# Patient Record
Sex: FEMALE | Race: WHITE | NOT HISPANIC OR LATINO | Employment: UNEMPLOYED | URBAN - METROPOLITAN AREA
[De-identification: names, ages, dates, MRNs, and addresses within clinical notes are randomized per-mention and may not be internally consistent; named-entity substitution may affect disease eponyms.]

---

## 2020-01-24 ENCOUNTER — OUTSIDE PLACE OF SERVICE (OUTPATIENT)
Dept: CARDIOLOGY | Facility: CLINIC | Age: 80
End: 2020-01-24
Payer: MEDICARE

## 2020-01-24 ENCOUNTER — CLINICAL SUPPORT (OUTPATIENT)
Dept: CARDIOLOGY | Facility: CLINIC | Age: 80
End: 2020-01-24
Attending: INTERNAL MEDICINE
Payer: MEDICARE

## 2020-01-24 ENCOUNTER — HOSPITAL ENCOUNTER (INPATIENT)
Facility: HOSPITAL | Age: 80
LOS: 10 days | Discharge: NURSING, REHAB OR LONG-TERM CARE | DRG: 315 | End: 2020-02-03
Attending: INTERNAL MEDICINE | Admitting: INTERNAL MEDICINE
Payer: MEDICARE

## 2020-01-24 DIAGNOSIS — I31.39 PERICARDIAL EFFUSION: ICD-10-CM

## 2020-01-24 DIAGNOSIS — Z95.0 PACEMAKER: ICD-10-CM

## 2020-01-24 DIAGNOSIS — I48.20 CHRONIC ATRIAL FIBRILLATION: ICD-10-CM

## 2020-01-24 DIAGNOSIS — B95.8 STAPH INFECTION: ICD-10-CM

## 2020-01-24 LAB
AORTIC VALVE CUSP SEPERATION: 2 CM
ASCENDING AORTA: 3.3 CM
AV INDEX (PROSTH): 0.5
AV MEAN GRADIENT: 10 MMHG
AV PEAK GRADIENT: 19 MMHG
AV VALVE AREA: 1.42 CM2
AV VELOCITY RATIO: 0.5
CV ECHO LV RWT: 0.43 CM
DOP CALC AO PEAK VEL: 2.2 M/S
DOP CALC AO VTI: 39.6 CM
DOP CALC LVOT AREA: 2.8 CM2
DOP CALC LVOT DIAMETER: 1.9 CM
DOP CALC LVOT PEAK VEL: 1.1 M/S
DOP CALC LVOT STROKE VOLUME: 56.11 CM3
DOP CALCLVOT PEAK VEL VTI: 19.8 CM
E WAVE DECELERATION TIME: 290 MSEC
E/A RATIO: 0.67
E/E' RATIO: 20 M/S
ECHO LV POSTERIOR WALL: 1 CM (ref 0.6–1.1)
ESTIMATED AVG GLUCOSE: 120 MG/DL (ref 68–131)
FRACTIONAL SHORTENING: 28 % (ref 28–44)
HBA1C MFR BLD HPLC: 5.8 % (ref 4–5.6)
INTERVENTRICULAR SEPTUM: 1.8 CM (ref 0.6–1.1)
IVC PROX: 2.4 CM
IVRT: 138 MSEC
LA MAJOR: 3.8 CM
LA MINOR: 3.3 CM
LA WIDTH: 3.7 CM
LEFT ATRIUM SIZE: 3 CM
LEFT ATRIUM VOLUME: 33.33 CM3
LEFT INTERNAL DIMENSION IN SYSTOLE: 3.4 CM (ref 2.1–4)
LEFT VENTRICULAR INTERNAL DIMENSION IN DIASTOLE: 4.7 CM (ref 3.5–6)
LEFT VENTRICULAR MASS: 265.22 G
LV LATERAL E/E' RATIO: 17.5 M/S
LV SEPTAL E/E' RATIO: 23.33 M/S
MV A" WAVE DURATION": 161 MSEC
MV PEAK A VEL: 1.05 M/S
MV PEAK E VEL: 0.7 M/S
MV STENOSIS PRESSURE HALF TIME: 87 MS
MV VALVE AREA P 1/2 METHOD: 2.53 CM2
PISA TR MAX VEL: 2.92 M/S
POCT GLUCOSE: 136 MG/DL (ref 70–110)
PV PEAK VELOCITY: 1.04 CM/S
RA MAJOR: 3.8 CM
RA PRESSURE: 15 MMHG
RA WIDTH: 3.5 CM
RIGHT VENTRICULAR END-DIASTOLIC DIMENSION: 2.4 CM
SINUS: 2.9 CM
STJ: 2.6 CM
TDI LATERAL: 0.04 M/S
TDI SEPTAL: 0.03 M/S
TDI: 0.04 M/S
TR MAX PG: 34 MMHG
TRICUSPID ANNULAR PLANE SYSTOLIC EXCURSION: 1.8 CM
TV REST PULMONARY ARTERY PRESSURE: 49 MMHG

## 2020-01-24 PROCEDURE — 11000001 HC ACUTE MED/SURG PRIVATE ROOM

## 2020-01-24 PROCEDURE — 93010 ELECTROCARDIOGRAM REPORT: CPT | Mod: S$GLB,,, | Performed by: STUDENT IN AN ORGANIZED HEALTH CARE EDUCATION/TRAINING PROGRAM

## 2020-01-24 PROCEDURE — 93010 EKG 12-LEAD: ICD-10-PCS | Mod: ,,, | Performed by: INTERNAL MEDICINE

## 2020-01-24 PROCEDURE — 93306 TTE W/DOPPLER COMPLETE: CPT | Mod: 26,,, | Performed by: STUDENT IN AN ORGANIZED HEALTH CARE EDUCATION/TRAINING PROGRAM

## 2020-01-24 PROCEDURE — 93005 ELECTROCARDIOGRAM TRACING: CPT

## 2020-01-24 PROCEDURE — 93306 ECHO (CUPID ONLY): ICD-10-PCS | Mod: 26,,, | Performed by: STUDENT IN AN ORGANIZED HEALTH CARE EDUCATION/TRAINING PROGRAM

## 2020-01-24 PROCEDURE — 25000003 PHARM REV CODE 250: Performed by: NURSE PRACTITIONER

## 2020-01-24 PROCEDURE — 99222 PR INITIAL HOSPITAL CARE,LEVL II: ICD-10-PCS | Mod: ,,, | Performed by: STUDENT IN AN ORGANIZED HEALTH CARE EDUCATION/TRAINING PROGRAM

## 2020-01-24 PROCEDURE — 99222 1ST HOSP IP/OBS MODERATE 55: CPT | Mod: ,,, | Performed by: STUDENT IN AN ORGANIZED HEALTH CARE EDUCATION/TRAINING PROGRAM

## 2020-01-24 PROCEDURE — 93010 ELECTROCARDIOGRAM REPORT: CPT | Mod: ,,, | Performed by: INTERNAL MEDICINE

## 2020-01-24 PROCEDURE — 94761 N-INVAS EAR/PLS OXIMETRY MLT: CPT

## 2020-01-24 PROCEDURE — 36415 COLL VENOUS BLD VENIPUNCTURE: CPT

## 2020-01-24 PROCEDURE — 63600175 PHARM REV CODE 636 W HCPCS: Performed by: NURSE PRACTITIONER

## 2020-01-24 PROCEDURE — 83036 HEMOGLOBIN GLYCOSYLATED A1C: CPT

## 2020-01-24 PROCEDURE — 93010 PR ELECTROCARDIOGRAM REPORT: ICD-10-PCS | Mod: S$GLB,,, | Performed by: STUDENT IN AN ORGANIZED HEALTH CARE EDUCATION/TRAINING PROGRAM

## 2020-01-24 RX ORDER — INSULIN ASPART 100 [IU]/ML
0-5 INJECTION, SOLUTION INTRAVENOUS; SUBCUTANEOUS
Status: DISCONTINUED | OUTPATIENT
Start: 2020-01-24 | End: 2020-02-03 | Stop reason: HOSPADM

## 2020-01-24 RX ORDER — CARVEDILOL 3.12 MG/1
3.12 TABLET ORAL 2 TIMES DAILY
Status: DISCONTINUED | OUTPATIENT
Start: 2020-01-24 | End: 2020-01-25

## 2020-01-24 RX ORDER — ACETAMINOPHEN 325 MG/1
650 TABLET ORAL EVERY 4 HOURS PRN
Status: DISCONTINUED | OUTPATIENT
Start: 2020-01-24 | End: 2020-01-27 | Stop reason: SDUPTHER

## 2020-01-24 RX ORDER — HYDROCODONE BITARTRATE AND ACETAMINOPHEN 10; 325 MG/1; MG/1
1 TABLET ORAL EVERY 6 HOURS PRN
Status: DISCONTINUED | OUTPATIENT
Start: 2020-01-24 | End: 2020-02-03 | Stop reason: HOSPADM

## 2020-01-24 RX ORDER — SODIUM CHLORIDE 0.9 % (FLUSH) 0.9 %
10 SYRINGE (ML) INJECTION
Status: DISCONTINUED | OUTPATIENT
Start: 2020-01-24 | End: 2020-02-03 | Stop reason: HOSPADM

## 2020-01-24 RX ORDER — ONDANSETRON 8 MG/1
8 TABLET, ORALLY DISINTEGRATING ORAL EVERY 8 HOURS PRN
Status: DISCONTINUED | OUTPATIENT
Start: 2020-01-24 | End: 2020-02-03 | Stop reason: HOSPADM

## 2020-01-24 RX ORDER — POLYETHYLENE GLYCOL 3350 17 G/17G
17 POWDER, FOR SOLUTION ORAL 2 TIMES DAILY PRN
Status: DISCONTINUED | OUTPATIENT
Start: 2020-01-24 | End: 2020-02-03 | Stop reason: HOSPADM

## 2020-01-24 RX ORDER — MAG HYDROX/ALUMINUM HYD/SIMETH 200-200-20
30 SUSPENSION, ORAL (FINAL DOSE FORM) ORAL EVERY 6 HOURS PRN
Status: DISCONTINUED | OUTPATIENT
Start: 2020-01-24 | End: 2020-02-03 | Stop reason: HOSPADM

## 2020-01-24 RX ORDER — IBUPROFEN 200 MG
16 TABLET ORAL
Status: DISCONTINUED | OUTPATIENT
Start: 2020-01-24 | End: 2020-02-03 | Stop reason: HOSPADM

## 2020-01-24 RX ORDER — TALC
6 POWDER (GRAM) TOPICAL NIGHTLY PRN
Status: DISCONTINUED | OUTPATIENT
Start: 2020-01-24 | End: 2020-02-03 | Stop reason: HOSPADM

## 2020-01-24 RX ORDER — HEPARIN SODIUM 5000 [USP'U]/ML
5000 INJECTION, SOLUTION INTRAVENOUS; SUBCUTANEOUS EVERY 8 HOURS
Status: DISCONTINUED | OUTPATIENT
Start: 2020-01-24 | End: 2020-01-25

## 2020-01-24 RX ORDER — CLOPIDOGREL BISULFATE 75 MG/1
75 TABLET ORAL DAILY
Status: DISCONTINUED | OUTPATIENT
Start: 2020-01-25 | End: 2020-02-03 | Stop reason: HOSPADM

## 2020-01-24 RX ORDER — GLUCAGON 1 MG
1 KIT INJECTION
Status: DISCONTINUED | OUTPATIENT
Start: 2020-01-24 | End: 2020-02-03 | Stop reason: HOSPADM

## 2020-01-24 RX ORDER — GABAPENTIN 300 MG/1
300 CAPSULE ORAL 3 TIMES DAILY
Status: DISCONTINUED | OUTPATIENT
Start: 2020-01-24 | End: 2020-02-03 | Stop reason: HOSPADM

## 2020-01-24 RX ORDER — DULOXETIN HYDROCHLORIDE 30 MG/1
30 CAPSULE, DELAYED RELEASE ORAL DAILY
Status: DISCONTINUED | OUTPATIENT
Start: 2020-01-25 | End: 2020-02-03 | Stop reason: HOSPADM

## 2020-01-24 RX ORDER — SIMETHICONE 125 MG
125 TABLET,CHEWABLE ORAL EVERY 6 HOURS PRN
Status: DISCONTINUED | OUTPATIENT
Start: 2020-01-24 | End: 2020-02-03 | Stop reason: HOSPADM

## 2020-01-24 RX ORDER — IBUPROFEN 200 MG
24 TABLET ORAL
Status: DISCONTINUED | OUTPATIENT
Start: 2020-01-24 | End: 2020-02-03 | Stop reason: HOSPADM

## 2020-01-24 RX ORDER — PANTOPRAZOLE SODIUM 40 MG/1
40 TABLET, DELAYED RELEASE ORAL DAILY
Status: DISCONTINUED | OUTPATIENT
Start: 2020-01-25 | End: 2020-02-03 | Stop reason: HOSPADM

## 2020-01-24 RX ADMIN — GABAPENTIN 300 MG: 300 CAPSULE ORAL at 09:01

## 2020-01-24 RX ADMIN — HEPARIN SODIUM 5000 UNITS: 5000 INJECTION, SOLUTION INTRAVENOUS; SUBCUTANEOUS at 09:01

## 2020-01-24 RX ADMIN — CARVEDILOL 3.12 MG: 3.12 TABLET, FILM COATED ORAL at 09:01

## 2020-01-24 NOTE — Clinical Note
Dilator removed and inserted 8F dilator from xiphoid process, in and out  Straight pericardiocentesis catheter 8.3F inserted, fluid sample obtained

## 2020-01-24 NOTE — Clinical Note
The site was marked. Prepped: subxiphoid process. Prepped with: ChloraPrep. The site was clipped. The patient was draped.

## 2020-01-24 NOTE — Clinical Note
Pericardiocentesis catheter inserted and pericardial tap performed under US guidance in the, removed fluid from the and sewed in with ERWIN drain in the pericardial space. Total fluid removed = 920 mL. Removed fluid appeared sanguineous.

## 2020-01-25 PROBLEM — I48.0 PAROXYSMAL ATRIAL FIBRILLATION: Status: ACTIVE | Noted: 2020-01-25

## 2020-01-25 PROBLEM — Z95.0 POSTSURGICAL CARDIAC PACEMAKER IN SITU: Status: ACTIVE | Noted: 2020-01-25

## 2020-01-25 LAB
ALBUMIN SERPL BCP-MCNC: 2.4 G/DL (ref 3.5–5.2)
ALP SERPL-CCNC: 79 U/L (ref 55–135)
ALT SERPL W/O P-5'-P-CCNC: 25 U/L (ref 10–44)
ANION GAP SERPL CALC-SCNC: 6 MMOL/L (ref 8–16)
ANION GAP SERPL CALC-SCNC: 7 MMOL/L (ref 8–16)
AST SERPL-CCNC: 17 U/L (ref 10–40)
BASOPHILS # BLD AUTO: 0.01 K/UL (ref 0–0.2)
BASOPHILS NFR BLD: 0.2 % (ref 0–1.9)
BILIRUB SERPL-MCNC: 1 MG/DL (ref 0.1–1)
BUN SERPL-MCNC: 11 MG/DL (ref 8–23)
BUN SERPL-MCNC: 12 MG/DL (ref 8–23)
CALCIUM SERPL-MCNC: 8.2 MG/DL (ref 8.7–10.5)
CALCIUM SERPL-MCNC: 8.3 MG/DL (ref 8.7–10.5)
CHLORIDE SERPL-SCNC: 103 MMOL/L (ref 95–110)
CHLORIDE SERPL-SCNC: 105 MMOL/L (ref 95–110)
CO2 SERPL-SCNC: 28 MMOL/L (ref 23–29)
CO2 SERPL-SCNC: 29 MMOL/L (ref 23–29)
CREAT SERPL-MCNC: 0.7 MG/DL (ref 0.5–1.4)
CREAT SERPL-MCNC: 0.7 MG/DL (ref 0.5–1.4)
DIFFERENTIAL METHOD: ABNORMAL
EOSINOPHIL # BLD AUTO: 0.1 K/UL (ref 0–0.5)
EOSINOPHIL NFR BLD: 1.2 % (ref 0–8)
ERYTHROCYTE [DISTWIDTH] IN BLOOD BY AUTOMATED COUNT: 14.8 % (ref 11.5–14.5)
EST. GFR  (AFRICAN AMERICAN): >60 ML/MIN/1.73 M^2
EST. GFR  (AFRICAN AMERICAN): >60 ML/MIN/1.73 M^2
EST. GFR  (NON AFRICAN AMERICAN): >60 ML/MIN/1.73 M^2
EST. GFR  (NON AFRICAN AMERICAN): >60 ML/MIN/1.73 M^2
GLUCOSE SERPL-MCNC: 123 MG/DL (ref 70–110)
GLUCOSE SERPL-MCNC: 210 MG/DL (ref 70–110)
HCT VFR BLD AUTO: 31.4 % (ref 37–48.5)
HGB BLD-MCNC: 9.6 G/DL (ref 12–16)
LYMPHOCYTES # BLD AUTO: 1.4 K/UL (ref 1–4.8)
LYMPHOCYTES NFR BLD: 23 % (ref 18–48)
MCH RBC QN AUTO: 28.4 PG (ref 27–31)
MCHC RBC AUTO-ENTMCNC: 30.6 G/DL (ref 32–36)
MCV RBC AUTO: 93 FL (ref 82–98)
MONOCYTES # BLD AUTO: 0.8 K/UL (ref 0.3–1)
MONOCYTES NFR BLD: 13 % (ref 4–15)
NEUTROPHILS # BLD AUTO: 3.8 K/UL (ref 1.8–7.7)
NEUTROPHILS NFR BLD: 62.6 % (ref 38–73)
PLATELET # BLD AUTO: 155 K/UL (ref 150–350)
PMV BLD AUTO: 12.5 FL (ref 9.2–12.9)
POCT GLUCOSE: 120 MG/DL (ref 70–110)
POCT GLUCOSE: 138 MG/DL (ref 70–110)
POCT GLUCOSE: 175 MG/DL (ref 70–110)
POCT GLUCOSE: 185 MG/DL (ref 70–110)
POTASSIUM SERPL-SCNC: 4.1 MMOL/L (ref 3.5–5.1)
POTASSIUM SERPL-SCNC: 4.3 MMOL/L (ref 3.5–5.1)
PROT SERPL-MCNC: 5.2 G/DL (ref 6–8.4)
RBC # BLD AUTO: 3.38 M/UL (ref 4–5.4)
SODIUM SERPL-SCNC: 138 MMOL/L (ref 136–145)
SODIUM SERPL-SCNC: 140 MMOL/L (ref 136–145)
T3FREE SERPL-MCNC: 1.3 PG/ML (ref 2.3–4.2)
T4 FREE SERPL-MCNC: 0.9 NG/DL (ref 0.71–1.51)
TSH SERPL DL<=0.005 MIU/L-ACNC: 0.18 UIU/ML (ref 0.4–4)
WBC # BLD AUTO: 6 K/UL (ref 3.9–12.7)

## 2020-01-25 PROCEDURE — 63600175 PHARM REV CODE 636 W HCPCS: Performed by: NURSE PRACTITIONER

## 2020-01-25 PROCEDURE — 36415 COLL VENOUS BLD VENIPUNCTURE: CPT

## 2020-01-25 PROCEDURE — 84481 FREE ASSAY (FT-3): CPT

## 2020-01-25 PROCEDURE — 94761 N-INVAS EAR/PLS OXIMETRY MLT: CPT

## 2020-01-25 PROCEDURE — 84443 ASSAY THYROID STIM HORMONE: CPT

## 2020-01-25 PROCEDURE — 99223 1ST HOSP IP/OBS HIGH 75: CPT | Mod: AI,,, | Performed by: INTERNAL MEDICINE

## 2020-01-25 PROCEDURE — 11000001 HC ACUTE MED/SURG PRIVATE ROOM

## 2020-01-25 PROCEDURE — 25000003 PHARM REV CODE 250: Performed by: INTERNAL MEDICINE

## 2020-01-25 PROCEDURE — 80048 BASIC METABOLIC PNL TOTAL CA: CPT

## 2020-01-25 PROCEDURE — 80053 COMPREHEN METABOLIC PANEL: CPT

## 2020-01-25 PROCEDURE — 84439 ASSAY OF FREE THYROXINE: CPT

## 2020-01-25 PROCEDURE — 85025 COMPLETE CBC W/AUTO DIFF WBC: CPT

## 2020-01-25 PROCEDURE — 25000003 PHARM REV CODE 250: Performed by: NURSE PRACTITIONER

## 2020-01-25 PROCEDURE — 99223 PR INITIAL HOSPITAL CARE,LEVL III: ICD-10-PCS | Mod: AI,,, | Performed by: INTERNAL MEDICINE

## 2020-01-25 RX ORDER — CARVEDILOL 6.25 MG/1
6.25 TABLET ORAL 2 TIMES DAILY
Status: DISCONTINUED | OUTPATIENT
Start: 2020-01-25 | End: 2020-02-03 | Stop reason: HOSPADM

## 2020-01-25 RX ORDER — LABETALOL HYDROCHLORIDE 5 MG/ML
10 INJECTION, SOLUTION INTRAVENOUS EVERY 6 HOURS PRN
Status: DISCONTINUED | OUTPATIENT
Start: 2020-01-25 | End: 2020-02-03 | Stop reason: HOSPADM

## 2020-01-25 RX ADMIN — GABAPENTIN 300 MG: 300 CAPSULE ORAL at 03:01

## 2020-01-25 RX ADMIN — LABETALOL HYDROCHLORIDE 10 MG: 5 INJECTION INTRAVENOUS at 06:01

## 2020-01-25 RX ADMIN — HEPARIN SODIUM 5000 UNITS: 5000 INJECTION, SOLUTION INTRAVENOUS; SUBCUTANEOUS at 05:01

## 2020-01-25 RX ADMIN — GABAPENTIN 300 MG: 300 CAPSULE ORAL at 09:01

## 2020-01-25 RX ADMIN — DULOXETINE 30 MG: 30 CAPSULE, DELAYED RELEASE ORAL at 09:01

## 2020-01-25 RX ADMIN — CARVEDILOL 3.12 MG: 3.12 TABLET, FILM COATED ORAL at 09:01

## 2020-01-25 RX ADMIN — CARVEDILOL 6.25 MG: 6.25 TABLET, FILM COATED ORAL at 09:01

## 2020-01-25 RX ADMIN — CLOPIDOGREL BISULFATE 75 MG: 75 TABLET, FILM COATED ORAL at 09:01

## 2020-01-25 RX ADMIN — PANTOPRAZOLE SODIUM 40 MG: 40 TABLET, DELAYED RELEASE ORAL at 09:01

## 2020-01-25 NOTE — NURSING
Patient arrived from [facility]. Placed in bed. Monitor placed and vitals charted per flowsheet. Notified virtual nurse of arrival and will give report to oncoming nurse.

## 2020-01-25 NOTE — PLAN OF CARE
Patient lying in bed. Respirations even and unlabored. BBS clear. Patient has elevated Blood pressure 175/74. Placed call with physician Awaiting further orders.

## 2020-01-25 NOTE — H&P
[facility]  Cardiology  History and Physical     Patient Name: Dori Whatley  MRN: [id number]  Admission Date: 1/24/2020  Code Status: Full Code   Attending Provider: Vadim Good MD   Primary Care Physician: Tru Sanchez MD  Principal Problem:<principal problem not specified>    Patient information was obtained from patient, spouse/SO, relative(s) and past medical records.     Subjective:     Chief Complaint:       HPI:  Hx obtained from the patient, her sister and   She is pleasant 79 year old female with advanced dementia  Patient was in her usual state of health till January 6 when she has pneumonia picture and was admitted to [facility]. She stayed for one week then was discharged to LTAC at [facility], where she had shortness of breath and was found to have Pleural effusion, she had thoracentesis  That was followed by CT chest which showed pericardial effusion and Echo was done that showed large pericardial effusion. Patient is hemodynamically stable BP toward the higher side. Transferred to [facility] for possible pericardiocentesis.     According to the family she was hospitalized at [place] and was completely confused for 3 days ? Viral illness. Also her blood count was low and hematology was consulted.     She has PPM that was placed by Dr. Garcia for CHB, hx of A.fib (Eliquis) from [place] [facility] about 3 months ago, she was on eliquis at home. No hx of PCI or CABG     Also she has hx of breast CA s/p surgery 30 years ago and she gets yearly Mammogram, according to them last one was last year and was ok.          Past Medical History:   Diagnosis Date    Atherosclerosis of artery 10/7/2013    Breast cancer (HCC)   right 2008    Cancer (HCC)   Breast    Compression fracture of lumbosacral spine (HCC) 10/7/2013    DDD (degenerative disc disease), lumbar 10/7/2013    Essential hypertension, benign    GERD (gastroesophageal reflux  disease)    Hyperlipidemia    Lacunar infarction (HCC) 10/7/2013    Mechanical complication of internal fixation device such as nail, plate or farida (HCC) 10/7/2013    Neuropathy    Osteoporosis, post-menopausal 11/15/2012    Pulmonary hypertension (HCC) 10/7/2013    Restless legs syndrome 10/7/2013    S/P lumbar fusion    S/P mastectomy    Transient ischemic attack (TIA), and cerebral infarction without residual deficits(V12.54)    Type II or unspecified type diabetes mellitus with other specified manifestations, not stated as uncontrolled   Unknown    Vitamin B 12 deficiency 5/14/2014    Vitamin D deficiency disease     Past Surgical History     Back surgery    Bladder lift    BREAST SURGERY Breast Cancer    HYSTERECTOMY    LEFT HEART CATH N/A 11/6/2019   Performed by [facility] at [facility] CARDIAC C[facility]    MASTECTOMY Right   2008    PACEMAKER DC NEW N/A 11/6/2019   Performed by Soy Garcia MD at [facility] CARDIAC CATH LABS                   Past Medical History:   Diagnosis Date    Arthritis     Cancer     breast    Chronic back pain     Diabetes mellitus     Hypertension     Stroke     minor one       Past Surgical History:   Procedure Laterality Date    BACK SURGERY      HYSTERECTOMY      MASTECTOMY      TONSILLECTOMY         Review of patient's allergies indicates:   Allergen Reactions    Nitrofurantoin monohyd/m-cryst Rash and Shortness Of Breath       No current facility-administered medications on file prior to encounter.      Current Outpatient Medications on File Prior to Encounter   Medication Sig    ammonium lactate 12 % Crea Apply 1 Act topically 2 (two) times daily.    carvedilol (COREG) 3.125 MG tablet Take 3.125 mg by mouth 2 (two) times daily.     clopidogrel (PLAVIX) 75 mg tablet Take 75 mg by mouth once daily.     duloxetine (CYMBALTA) 30 MG capsule Take 30 mg by mouth once daily.    glipiZIDE (GLUCOTROL) 5 MG TR24 Take 5 mg by mouth daily with breakfast.      hydrocodone-acetaminophen 10-325mg (NORCO)  mg Tab Take by mouth every 6 (six) hours as needed.     losartan-hydrochlorothiazide 100-12.5 mg (HYZAAR) 100-12.5 mg Tab Take 1 tablet by mouth once daily.    methocarbamol (ROBAXIN) 750 MG Tab Take 500 mg by mouth 4 (four) times daily.    pantoprazole (PROTONIX) 40 MG tablet Take 40 mg by mouth once daily.     ergocalciferol (VITAMIN D2) 50,000 unit Cap Take 50,000 Units by mouth every 7 days.    gabapentin (NEURONTIN) 300 MG capsule Take 300 mg by mouth 3 (three) times daily.     Family History     None        Tobacco Use    Smoking status: Never Smoker    Smokeless tobacco: Never Used   Substance and Sexual Activity    Alcohol use: Not Currently    Drug use: Never    Sexual activity: Not Currently     Review of Systems   Unable to perform ROS: dementia     Objective:     Vital Signs (Most Recent):  Temp: 98.6 °F (37 °C) (01/25/20 1143)  Pulse: 73 (01/25/20 1152)  Resp: 18 (01/25/20 1143)  BP: (!) 168/76 (01/25/20 1143)  SpO2: 96 % (01/25/20 1143) Vital Signs (24h Range):  Temp:  [98.2 °F (36.8 °C)-99.1 °F (37.3 °C)] 98.6 °F (37 °C)  Pulse:  [68-76] 73  Resp:  [16-20] 18  SpO2:  [92 %-96 %] 96 %  BP: (120-175)/(68-82) 168/76        Body mass index is 22.71 kg/m².    SpO2: 96 %  O2 Device (Oxygen Therapy): room air      Intake/Output Summary (Last 24 hours) at 1/25/2020 1156  Last data filed at 1/25/2020 0900  Gross per 24 hour   Intake 1050 ml   Output 750 ml   Net 300 ml       Lines/Drains/Airways     Drain            Female External Urinary Catheter 01/24/20 2123 less than 1 day          Peripheral Intravenous Line                 Peripheral IV - Single Lumen 01/24/20 22 G Left Forearm 1 day                Physical Exam   Constitutional: No distress.   HENT:   Head: Normocephalic and atraumatic.   Eyes: Pupils are equal, round, and reactive to light. Conjunctivae are normal.   Neck: Neck supple. No JVD present.   Cardiovascular: Exam reveals no  gallop and no friction rub.   No murmur heard.  Distant heart sounds   Pulmonary/Chest: No respiratory distress. She has no wheezes.   diminished basilar breath sounds   Abdominal: Soft. Bowel sounds are normal. She exhibits distension. There is no tenderness. There is no rebound.   Musculoskeletal: She exhibits no edema or deformity.   Neurological: She is alert. No cranial nerve deficit. Coordination normal.   Skin: Skin is warm and dry. No rash noted. She is not diaphoretic. No erythema.   Psychiatric: She has a normal mood and affect. Her behavior is normal.       Significant Labs:   BMP:   Recent Labs   Lab 01/25/20  0318 01/25/20  0901   * 210*    138   K 4.1 4.3    103   CO2 29 28   BUN 12 11   CREATININE 0.7 0.7   CALCIUM 8.3* 8.2*   , CMP   Recent Labs   Lab 01/25/20 0318 01/25/20  0901    138   K 4.1 4.3    103   CO2 29 28   * 210*   BUN 12 11   CREATININE 0.7 0.7   CALCIUM 8.3* 8.2*   PROT  --  5.2*   ALBUMIN  --  2.4*   BILITOT  --  1.0   ALKPHOS  --  79   AST  --  17   ALT  --  25   ANIONGAP 6* 7*   ESTGFRAFRICA >60 >60   EGFRNONAA >60 >60    and CBC   Recent Labs   Lab 01/25/20 0318   WBC 6.00   HGB 9.6*   HCT 31.4*          Significant Imaging: Echocardiogram:   2D echo with color flow doppler: No results found for this or any previous visit. and Transthoracic echo (TTE) complete (Cupid Only):   Results for orders placed or performed in visit on 01/24/20   Echo Color Flow Doppler? Yes   Result Value Ref Range    TDI SEPTAL 0.03 m/s    LV LATERAL E/E' RATIO 17.50 m/s    LV SEPTAL E/E' RATIO 23.33 m/s    LA WIDTH 3.70 cm    AORTIC VALVE CUSP SEPERATION 2 cm    TDI LATERAL 0.04 m/s    PV PEAK VELOCITY 1.04 cm/s    LVIDD 4.70 3.5 - 6.0 cm    IVS 1.80 0.6 - 1.1 cm    PW 1.00 0.6 - 1.1 cm    LVIDS 3.40 2.1 - 4.0 cm    FS 28 28 - 44 %    IVC proximal 2.4 cm    LA volume 33.33 cm3    Sinus 2.90 cm    STJ 2.60 cm    Ascending aorta 3.30 cm    LV mass 265.22 g     "LA size 3.00 cm    RVDD 2.40 cm    TAPSE 1.80 cm    Left Ventricle Relative Wall Thickness 0.43 cm    AV mean gradient 10 mmHg    AV valve area 1.42 cm2    AV Velocity Ratio 0.50     AV index (prosthetic) 0.50     MV valve area p 1/2 method 2.53 cm2    E/A ratio 0.67     Mean e' 0.04 m/s    E wave decelartion time 290.00 msec    IVRT 138.00 msec    MV "A" wave duration 161.00 msec    LVOT diameter 1.90 cm    LVOT area 2.8 cm2    LVOT peak cristobal 1.1 m/s    LVOT peak VTI 19.80 cm    Ao peak cristobal 2.2 m/s    Ao VTI 39.60 cm    LVOT stroke volume 56.11 cm3    AV peak gradient 19 mmHg    E/E' ratio 20.00 m/s    MV Peak E Cristobal 0.70 m/s    TR Max Cristobal 2.92 m/s    MV stenosis pressure 1/2 time 87 ms    MV Peak A Cristobal 1.05 m/s    RA Major Axis 3.80 cm    Left Atrium Minor Axis 3.30 cm    Left Atrium Major Axis 3.80 cm    Triscuspid Valve Regurgitation Peak Gradient 34 mmHg    RA Width 3.50 cm    Right Atrial Pressure (from IVC) 15 mmHg    TV rest pulmonary artery pressure 49 mmHg    Narrative    · Normal left ventricular systolic function. The estimated ejection   fraction is 55%.  · Concentric left ventricular hypertrophy.  · Grade I (mild) left ventricular diastolic dysfunction consistent with   impaired relaxation.  · No wall motion abnormalities.  · Normal right ventricular systolic function.  · Large circumferential pericardial effusion. ~ 2.0-3.0 . min RA/RV   collapse on diastole. NO respiratory variation. pre-tamponade physiology  · Pulmonary hypertension present.  · The estimated PA systolic pressure is 49 mmHg.  · Elevated central venous pressure (15 mmHg).  · There is a left pleural effusion.  · Mild aortic valve stenosis.  · Aortic valve area is 1.42 cm2; peak velocity is 2.2 m/s; mean gradient   is 10 mmHg.        Assessment and Plan:     Postsurgical cardiac pacemaker in situ  For CHB    Pericardial effusion  Large pericardial effusion with early tamponade physiology/ RA collapse.   BP is stable    Patient with " underlying pulmonary HTN hence full blown chamber collapse can be masked somewhat.     Continue to hold Eliquis    Will plan for pericardiocentesis. Early next week, unless patient clinical status changes and requires urgent intervention.    Etiology is unclear ? Viral illness. Will obtain records from [facility].   Also obtain the pleural fluid stud report and pulmonary notes from [place].     Check TSH/T3/T4            Hypertension  . Will increase coreg. BP is 170s        VTE Risk Mitigation (From admission, onward)         Ordered     Place sequential compression device  Until discontinued      01/25/20 1210     IP VTE LOW RISK PATIENT  Once      01/24/20 1919     Place LASHANDA hose  Until discontinued      01/24/20 1919     Place sequential compression device  Until discontinued      01/24/20 1919                Vadim Good MD  Cardiology   [facility]

## 2020-01-25 NOTE — PLAN OF CARE
Problem: Adult Inpatient Plan of Care  Goal: Plan of Care Review  Outcome: Ongoing, Progressing  Flowsheets (Taken 1/24/2020 2120)  Plan of Care Reviewed With: patient;sibling  Goal: Absence of Hospital-Acquired Illness or Injury  Outcome: Ongoing, Progressing  Intervention: Identify and Manage Fall Risk  Flowsheets (Taken 1/24/2020 2120)  Safety Promotion/Fall Prevention: assistive device/personal item within reach;bed alarm set;diversional activities provided;Fall Risk reviewed with patient/family;Fall Risk signage in place;high risk medications identified;medications reviewed;nonskid shoes/socks when out of bed;side rails raised x 2;supervised activity;instructed to call staff for mobility  Intervention: Prevent VTE (venous thromboembolism)  Flowsheets (Taken 1/24/2020 2120)  VTE Prevention/Management: dorsiflexion/plantar flexion performed  Goal: Optimal Comfort and Wellbeing  Outcome: Ongoing, Progressing  Intervention: Provide Person-Centered Care  Flowsheets (Taken 1/24/2020 2120)  Trust Relationship/Rapport: care explained;questions answered;choices provided;questions encouraged;emotional support provided;reassurance provided;empathic listening provided;thoughts/feelings acknowledged     Problem: Fall Injury Risk  Goal: Absence of Fall and Fall-Related Injury  Outcome: Ongoing, Progressing  Intervention: Identify and Manage Contributors to Fall Injury Risk  Flowsheets (Taken 1/24/2020 2120)  Self-Care Promotion: independence encouraged;BADL personal objects within reach;BADL personal routines maintained  Medication Review/Management: medications reviewed;high risk medications identified  Intervention: Promote Injury-Free Environment  Flowsheets (Taken 1/24/2020 2120)  Safety Promotion/Fall Prevention: assistive device/personal item within reach;bed alarm set;diversional activities provided;Fall Risk reviewed with patient/family;Fall Risk signage in place;high risk medications identified;medications  reviewed;nonskid shoes/socks when out of bed;side rails raised x 2;supervised activity;instructed to call staff for mobility  Environmental Safety Modification: assistive device/personal items within reach;clutter free environment maintained;room organization consistent     Problem: Pain Acute  Goal: Optimal Pain Control  Outcome: Ongoing, Progressing  Intervention: Develop Pain Management Plan  Flowsheets (Taken 1/24/2020 2120)  Pain Management Interventions: care clustered;diversional activity provided;pain management plan reviewed with patient/caregiver  Intervention: Prevent or Manage Pain  Flowsheets (Taken 1/24/2020 2120)  Sleep/Rest Enhancement: awakenings minimized;consistent schedule promoted;family presence promoted;regular sleep/rest pattern promoted  Sensory Stimulation Regulation: music/television provided for relaxation  Intervention: Optimize Psychosocial Wellbeing  Flowsheets (Taken 1/24/2020 2120)  Supportive Measures: active listening utilized;goal setting facilitated;problem solving facilitated;self-responsibility promoted  Diversional Activities: television     Cued into patient's room.  Permission received per patient to turn camera to view patient.  Introduced as VN for night shift that will be working with floor nurse and nursing assistant.  Sibling at bedside.  Educated patient on VN's role in patient care. Plan of care reviewed with patient. Education per flowsheet.  Opportunity given for questions and questions answered.  Admission assessment questions answered.  No complaints.  Instructed to call for assistance.  Will cont to monitor.    Labs, notes, and orders reviewed.

## 2020-01-25 NOTE — PLAN OF CARE
Patient AAOx3. Sister and  at bedside, Patient denies any pain or SOB. BBS clear. Left side diminished. Placed on Tele paced 70's. Abdomen soft non tender. Patient. Patient voids per diaper in place. Mo hose maintained. Bed alarm in place Call light within reach.

## 2020-01-25 NOTE — NURSING
Cued into patients room and adjusted camera with patients permission. Introduced myself as VN and explained I would be working with the bedside nurse. Bed low, locked and call bell within reach. Patient verbalized understanding to call for any needs or assistance. No complaints of pain at this time. No questions at this time. Will continue to monitor patient.

## 2020-01-25 NOTE — SUBJECTIVE & OBJECTIVE
Past Medical History:   Diagnosis Date    Arthritis     Cancer     breast    Chronic back pain     Diabetes mellitus     Hypertension     Stroke     minor one       Past Surgical History:   Procedure Laterality Date    BACK SURGERY      HYSTERECTOMY      MASTECTOMY      TONSILLECTOMY         Review of patient's allergies indicates:   Allergen Reactions    Nitrofurantoin monohyd/m-cryst Rash and Shortness Of Breath       No current facility-administered medications on file prior to encounter.      Current Outpatient Medications on File Prior to Encounter   Medication Sig    ammonium lactate 12 % Crea Apply 1 Act topically 2 (two) times daily.    carvedilol (COREG) 3.125 MG tablet Take 3.125 mg by mouth 2 (two) times daily.     clopidogrel (PLAVIX) 75 mg tablet Take 75 mg by mouth once daily.     duloxetine (CYMBALTA) 30 MG capsule Take 30 mg by mouth once daily.    glipiZIDE (GLUCOTROL) 5 MG TR24 Take 5 mg by mouth daily with breakfast.     hydrocodone-acetaminophen 10-325mg (NORCO)  mg Tab Take by mouth every 6 (six) hours as needed.     losartan-hydrochlorothiazide 100-12.5 mg (HYZAAR) 100-12.5 mg Tab Take 1 tablet by mouth once daily.    methocarbamol (ROBAXIN) 750 MG Tab Take 500 mg by mouth 4 (four) times daily.    pantoprazole (PROTONIX) 40 MG tablet Take 40 mg by mouth once daily.     ergocalciferol (VITAMIN D2) 50,000 unit Cap Take 50,000 Units by mouth every 7 days.    gabapentin (NEURONTIN) 300 MG capsule Take 300 mg by mouth 3 (three) times daily.     Family History     None        Tobacco Use    Smoking status: Never Smoker    Smokeless tobacco: Never Used   Substance and Sexual Activity    Alcohol use: Not Currently    Drug use: Never    Sexual activity: Not Currently     Review of Systems   Unable to perform ROS: dementia     Objective:     Vital Signs (Most Recent):  Temp: 98.6 °F (37 °C) (01/25/20 1143)  Pulse: 73 (01/25/20 1152)  Resp: 18 (01/25/20 1143)  BP: (!)  168/76 (01/25/20 1143)  SpO2: 96 % (01/25/20 1143) Vital Signs (24h Range):  Temp:  [98.2 °F (36.8 °C)-99.1 °F (37.3 °C)] 98.6 °F (37 °C)  Pulse:  [68-76] 73  Resp:  [16-20] 18  SpO2:  [92 %-96 %] 96 %  BP: (120-175)/(68-82) 168/76        Body mass index is 22.71 kg/m².    SpO2: 96 %  O2 Device (Oxygen Therapy): room air      Intake/Output Summary (Last 24 hours) at 1/25/2020 1156  Last data filed at 1/25/2020 0900  Gross per 24 hour   Intake 1050 ml   Output 750 ml   Net 300 ml       Lines/Drains/Airways     Drain            Female External Urinary Catheter 01/24/20 2123 less than 1 day          Peripheral Intravenous Line                 Peripheral IV - Single Lumen 01/24/20 22 G Left Forearm 1 day                Physical Exam   Constitutional: No distress.   HENT:   Head: Normocephalic and atraumatic.   Eyes: Pupils are equal, round, and reactive to light. Conjunctivae are normal.   Neck: Neck supple. No JVD present.   Cardiovascular: Exam reveals no gallop and no friction rub.   No murmur heard.  Distant heart sounds   Pulmonary/Chest: No respiratory distress. She has no wheezes.   diminished basilar breath sounds   Abdominal: Soft. Bowel sounds are normal. She exhibits distension. There is no tenderness. There is no rebound.   Musculoskeletal: She exhibits no edema or deformity.   Neurological: She is alert. No cranial nerve deficit. Coordination normal.   Skin: Skin is warm and dry. No rash noted. She is not diaphoretic. No erythema.   Psychiatric: She has a normal mood and affect. Her behavior is normal.       Significant Labs:   BMP:   Recent Labs   Lab 01/25/20  0318 01/25/20  0901   * 210*    138   K 4.1 4.3    103   CO2 29 28   BUN 12 11   CREATININE 0.7 0.7   CALCIUM 8.3* 8.2*   , CMP   Recent Labs   Lab 01/25/20  0318 01/25/20  0901    138   K 4.1 4.3    103   CO2 29 28   * 210*   BUN 12 11   CREATININE 0.7 0.7   CALCIUM 8.3* 8.2*   PROT  --  5.2*   ALBUMIN  --   "2.4*   BILITOT  --  1.0   ALKPHOS  --  79   AST  --  17   ALT  --  25   ANIONGAP 6* 7*   ESTGFRAFRICA >60 >60   EGFRNONAA >60 >60    and CBC   Recent Labs   Lab 01/25/20  0318   WBC 6.00   HGB 9.6*   HCT 31.4*          Significant Imaging: Echocardiogram:   2D echo with color flow doppler: No results found for this or any previous visit. and Transthoracic echo (TTE) complete (Cupid Only):   Results for orders placed or performed in visit on 01/24/20   Echo Color Flow Doppler? Yes   Result Value Ref Range    TDI SEPTAL 0.03 m/s    LV LATERAL E/E' RATIO 17.50 m/s    LV SEPTAL E/E' RATIO 23.33 m/s    LA WIDTH 3.70 cm    AORTIC VALVE CUSP SEPERATION 2 cm    TDI LATERAL 0.04 m/s    PV PEAK VELOCITY 1.04 cm/s    LVIDD 4.70 3.5 - 6.0 cm    IVS 1.80 0.6 - 1.1 cm    PW 1.00 0.6 - 1.1 cm    LVIDS 3.40 2.1 - 4.0 cm    FS 28 28 - 44 %    IVC proximal 2.4 cm    LA volume 33.33 cm3    Sinus 2.90 cm    STJ 2.60 cm    Ascending aorta 3.30 cm    LV mass 265.22 g    LA size 3.00 cm    RVDD 2.40 cm    TAPSE 1.80 cm    Left Ventricle Relative Wall Thickness 0.43 cm    AV mean gradient 10 mmHg    AV valve area 1.42 cm2    AV Velocity Ratio 0.50     AV index (prosthetic) 0.50     MV valve area p 1/2 method 2.53 cm2    E/A ratio 0.67     Mean e' 0.04 m/s    E wave decelartion time 290.00 msec    IVRT 138.00 msec    MV "A" wave duration 161.00 msec    LVOT diameter 1.90 cm    LVOT area 2.8 cm2    LVOT peak cristobal 1.1 m/s    LVOT peak VTI 19.80 cm    Ao peak cristobal 2.2 m/s    Ao VTI 39.60 cm    LVOT stroke volume 56.11 cm3    AV peak gradient 19 mmHg    E/E' ratio 20.00 m/s    MV Peak E Cristobal 0.70 m/s    TR Max Cristobal 2.92 m/s    MV stenosis pressure 1/2 time 87 ms    MV Peak A Cristobal 1.05 m/s    RA Major Axis 3.80 cm    Left Atrium Minor Axis 3.30 cm    Left Atrium Major Axis 3.80 cm    Triscuspid Valve Regurgitation Peak Gradient 34 mmHg    RA Width 3.50 cm    Right Atrial Pressure (from IVC) 15 mmHg    TV rest pulmonary artery pressure 49 mmHg "    Narrative    · Normal left ventricular systolic function. The estimated ejection   fraction is 55%.  · Concentric left ventricular hypertrophy.  · Grade I (mild) left ventricular diastolic dysfunction consistent with   impaired relaxation.  · No wall motion abnormalities.  · Normal right ventricular systolic function.  · Large circumferential pericardial effusion. ~ 2.0-3.0 . min RA/RV   collapse on diastole. NO respiratory variation. pre-tamponade physiology  · Pulmonary hypertension present.  · The estimated PA systolic pressure is 49 mmHg.  · Elevated central venous pressure (15 mmHg).  · There is a left pleural effusion.  · Mild aortic valve stenosis.  · Aortic valve area is 1.42 cm2; peak velocity is 2.2 m/s; mean gradient   is 10 mmHg.

## 2020-01-25 NOTE — ASSESSMENT & PLAN NOTE
Large pericardial effusion with early tamponade physiology/ RA collapse.   BP is stable    Patient with underlying pulmonary HTN hence full blown chamber collapse can be masked somewhat.     Continue to hold Eliquis    Will plan for pericardiocentesis. Early next week, unless patient clinical status changes and requires urgent intervention.    Etiology is unclear ? Viral illness. Will obtain records from [facility].   Also obtain the pleural fluid stud report and pulmonary notes from [place].     Check TSH/T3/T4

## 2020-01-25 NOTE — NURSING
Dr. Good notified of current vitals; new order received.       01/25/20 0509   Vital Signs   Temp 98.4 °F (36.9 °C)   Temp src Oral   Pulse 74   Heart Rate Source Monitor   Resp 16   SpO2 95 %   BP (!) 175/74   MAP (mmHg) 107   BP Location Left arm   Patient Position Lying

## 2020-01-25 NOTE — PLAN OF CARE
Patient lying in bed, A/O. HOB elevated. No S/S of pain or discomfort noted at this time. NADN. Verbal, able to make needs known. Answers questions appropriately. Tele- paced rhythm. No diabetic distress noted. Plan of care and medications reviewed with patient and patient's family- verbalizes understanding. Bed in lowest position, side rails up x 2, call light within reach. Will endorse patient to oncoming nurse.

## 2020-01-25 NOTE — HPI
Hx obtained from the patient, her sister and   She is pleasant 79 year old female with advanced dementia  Patient was in her usual state of health till January 6 when she has pneumonia picture and was admitted to [facility]. She stayed for one week then was discharged to LTAC at [facility], where she had shortness of breath and was found to have Pleural effusion, she had thoracentesis  That was followed by CT chest which showed pericardial effusion and Echo was done that showed large pericardial effusion. Patient is hemodynamically stable BP toward the higher side. Transferred to [facility] for possible pericardiocentesis.     According to the family she was hospitalized at [place] and was completely confused for 3 days ? Viral illness. Also her blood count was low and hematology was consulted.     She has PPM that was placed by Dr. Garcia for CHB, hx of A.fib (Eliquis) from [place] [facility] about 3 months ago, she was on eliquis at home. No hx of PCI or CABG     Also she has hx of breast CA s/p surgery 30 years ago and she gets yearly Mammogram, according to them last one was last year and was ok.          Past Medical History:   Diagnosis Date    Atherosclerosis of artery 10/7/2013    Breast cancer (HCC)   right 2008    Cancer ([facility])   Breast    Compression fracture of lumbosacral spine ([facility]) 10/7/2013    DDD (degenerative disc disease), lumbar 10/7/2013    Essential hypertension, benign    GERD (gastroesophageal reflux disease)    Hyperlipidemia    Lacunar infarction ([facility]) 10/7/2013    Mechanical complication of internal fixation device such as nail, plate or farida ([facility]) 10/7/2013    Neuropathy    Osteoporosis, post-menopausal 11/15/2012    Pulmonary hypertension (HCC) 10/7/2013    Restless legs syndrome 10/7/2013    S/P lumbar fusion    S/P mastectomy    Transient ischemic attack (TIA), and cerebral infarction without residual deficits(V12.54)    Type II or  unspecified type diabetes mellitus with other specified manifestations, not stated as uncontrolled   Unknown    Vitamin B 12 deficiency 5/14/2014    Vitamin D deficiency disease     Past Surgical History     Back surgery    Bladder lift    BREAST SURGERY Breast Cancer    HYSTERECTOMY    LEFT HEART CATH N/A 11/6/2019   Performed by Saundra Martinez MD at [facility] CARDIAC CATH LABS    MASTECTOMY Right   2008    PACEMAKER DC NEW N/A 11/6/2019   Performed by Soy Garcia MD at [facility] CARDIAC CATH LABS

## 2020-01-26 LAB
ANION GAP SERPL CALC-SCNC: 8 MMOL/L (ref 8–16)
BASOPHILS # BLD AUTO: 0.01 K/UL (ref 0–0.2)
BASOPHILS NFR BLD: 0.2 % (ref 0–1.9)
BILIRUB UR QL STRIP: NEGATIVE
BUN SERPL-MCNC: 10 MG/DL (ref 8–23)
CALCIUM SERPL-MCNC: 8.3 MG/DL (ref 8.7–10.5)
CHLORIDE SERPL-SCNC: 103 MMOL/L (ref 95–110)
CLARITY UR: CLEAR
CO2 SERPL-SCNC: 28 MMOL/L (ref 23–29)
COLOR UR: YELLOW
CREAT SERPL-MCNC: 0.7 MG/DL (ref 0.5–1.4)
DIFFERENTIAL METHOD: ABNORMAL
EOSINOPHIL # BLD AUTO: 0.1 K/UL (ref 0–0.5)
EOSINOPHIL NFR BLD: 1.1 % (ref 0–8)
ERYTHROCYTE [DISTWIDTH] IN BLOOD BY AUTOMATED COUNT: 14.9 % (ref 11.5–14.5)
EST. GFR  (AFRICAN AMERICAN): >60 ML/MIN/1.73 M^2
EST. GFR  (NON AFRICAN AMERICAN): >60 ML/MIN/1.73 M^2
GLUCOSE SERPL-MCNC: 174 MG/DL (ref 70–110)
GLUCOSE UR QL STRIP: NEGATIVE
HCT VFR BLD AUTO: 34 % (ref 37–48.5)
HGB BLD-MCNC: 10.7 G/DL (ref 12–16)
HGB UR QL STRIP: NEGATIVE
KETONES UR QL STRIP: NEGATIVE
LEUKOCYTE ESTERASE UR QL STRIP: NEGATIVE
LYMPHOCYTES # BLD AUTO: 1.4 K/UL (ref 1–4.8)
LYMPHOCYTES NFR BLD: 21.5 % (ref 18–48)
MCH RBC QN AUTO: 28.5 PG (ref 27–31)
MCHC RBC AUTO-ENTMCNC: 31.5 G/DL (ref 32–36)
MCV RBC AUTO: 90 FL (ref 82–98)
MONOCYTES # BLD AUTO: 0.9 K/UL (ref 0.3–1)
MONOCYTES NFR BLD: 13.6 % (ref 4–15)
NEUTROPHILS # BLD AUTO: 4.1 K/UL (ref 1.8–7.7)
NEUTROPHILS NFR BLD: 63.6 % (ref 38–73)
NITRITE UR QL STRIP: NEGATIVE
PH UR STRIP: 7 [PH] (ref 5–8)
PLATELET # BLD AUTO: 171 K/UL (ref 150–350)
PMV BLD AUTO: 12.7 FL (ref 9.2–12.9)
POCT GLUCOSE: 138 MG/DL (ref 70–110)
POCT GLUCOSE: 159 MG/DL (ref 70–110)
POCT GLUCOSE: 173 MG/DL (ref 70–110)
POCT GLUCOSE: 194 MG/DL (ref 70–110)
POTASSIUM SERPL-SCNC: 4 MMOL/L (ref 3.5–5.1)
PROT UR QL STRIP: NEGATIVE
RBC # BLD AUTO: 3.76 M/UL (ref 4–5.4)
SODIUM SERPL-SCNC: 139 MMOL/L (ref 136–145)
SP GR UR STRIP: 1.01 (ref 1–1.03)
URN SPEC COLLECT METH UR: NORMAL
UROBILINOGEN UR STRIP-ACNC: 1 EU/DL
WBC # BLD AUTO: 6.41 K/UL (ref 3.9–12.7)

## 2020-01-26 PROCEDURE — 25000003 PHARM REV CODE 250: Performed by: NURSE PRACTITIONER

## 2020-01-26 PROCEDURE — 99233 SBSQ HOSP IP/OBS HIGH 50: CPT | Mod: ,,, | Performed by: INTERNAL MEDICINE

## 2020-01-26 PROCEDURE — 80048 BASIC METABOLIC PNL TOTAL CA: CPT

## 2020-01-26 PROCEDURE — 85025 COMPLETE CBC W/AUTO DIFF WBC: CPT

## 2020-01-26 PROCEDURE — 11000001 HC ACUTE MED/SURG PRIVATE ROOM

## 2020-01-26 PROCEDURE — 36415 COLL VENOUS BLD VENIPUNCTURE: CPT

## 2020-01-26 PROCEDURE — 99233 PR SUBSEQUENT HOSPITAL CARE,LEVL III: ICD-10-PCS | Mod: ,,, | Performed by: INTERNAL MEDICINE

## 2020-01-26 PROCEDURE — 81003 URINALYSIS AUTO W/O SCOPE: CPT

## 2020-01-26 PROCEDURE — 25000003 PHARM REV CODE 250: Performed by: INTERNAL MEDICINE

## 2020-01-26 PROCEDURE — 94761 N-INVAS EAR/PLS OXIMETRY MLT: CPT

## 2020-01-26 RX ADMIN — CARVEDILOL 6.25 MG: 6.25 TABLET, FILM COATED ORAL at 08:01

## 2020-01-26 RX ADMIN — LABETALOL HYDROCHLORIDE 10 MG: 5 INJECTION INTRAVENOUS at 12:01

## 2020-01-26 RX ADMIN — CARVEDILOL 6.25 MG: 6.25 TABLET, FILM COATED ORAL at 09:01

## 2020-01-26 RX ADMIN — DULOXETINE 30 MG: 30 CAPSULE, DELAYED RELEASE ORAL at 09:01

## 2020-01-26 RX ADMIN — GABAPENTIN 300 MG: 300 CAPSULE ORAL at 09:01

## 2020-01-26 RX ADMIN — PANTOPRAZOLE SODIUM 40 MG: 40 TABLET, DELAYED RELEASE ORAL at 09:01

## 2020-01-26 RX ADMIN — CLOPIDOGREL BISULFATE 75 MG: 75 TABLET, FILM COATED ORAL at 09:01

## 2020-01-26 RX ADMIN — GABAPENTIN 300 MG: 300 CAPSULE ORAL at 03:01

## 2020-01-26 RX ADMIN — LABETALOL HYDROCHLORIDE 10 MG: 5 INJECTION INTRAVENOUS at 05:01

## 2020-01-26 RX ADMIN — GABAPENTIN 300 MG: 300 CAPSULE ORAL at 08:01

## 2020-01-26 NOTE — ASSESSMENT & PLAN NOTE
Continue making lifestyle changes that focus on good nutrition, regular exercise and stress management.     Today we reviewed your labs with findings of: normal aside from low Vitamin D.    Medication Plan: Continue current medication plan, continue Vitamin Large pericardial effusion with early tamponade physiology/ RA collapse.   BP is stable    Patient with underlying pulmonary HTN hence full blown chamber collapse can be masked somewhat.     Continue to hold Eliquis    Will plan for pericardiocentesis tomorrow, unless patient clinical status changes and requires urgent intervention.    Etiology is unclear ? viral illness. Will obtain records from [facility].   Also obtain the pleural fluid stud report and pulmonary notes from [place].     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 Will need to follow up with endo as outpatient.            you see any measurable changes. But you’ll start to build muscle, lose fat and burn more calories from the moment you begin weight training. Building muscle helps you:  1. Burn more calories.  Unlike fat, muscle beefs up your metabolism to help you burn abo

## 2020-01-26 NOTE — PROGRESS NOTES
[facility]  Cardiology  Progress Note    Patient Name: Dori Whatley  MRN: [id number]  Admission Date: 1/24/2020  Hospital Length of Stay: 2 days  Code Status: Full Code   Attending Physician: Vadim Good MD   Primary Care Physician: Tru Sanchez MD  Expected Discharge Date:   Principal Problem:<principal problem not specified>    Subjective:     Hospital Course:   1/26 Patient is doing well, Had episode of mild resp distress required NC o2. Now of oxygen and doing well.       Interval History:     Review of Systems   Unable to perform ROS: dementia     Objective:     Vital Signs (Most Recent):  Temp: 99.2 °F (37.3 °C) (01/26/20 1217)  Pulse: 84 (01/26/20 1217)  Resp: 18 (01/26/20 1217)  BP: (!) 156/73 (01/26/20 1217)  SpO2: (!) 94 % (01/26/20 1217) Vital Signs (24h Range):  Temp:  [97.2 °F (36.2 °C)-99.8 °F (37.7 °C)] 99.2 °F (37.3 °C)  Pulse:  [61-93] 84  Resp:  [16-18] 18  SpO2:  [94 %-100 %] 94 %  BP: (137-174)/(68-79) 156/73     Weight: 67.5 kg (148 lb 14.4 oz)  Body mass index is 23.32 kg/m².     SpO2: (!) 94 %  O2 Device (Oxygen Therapy): room air      Intake/Output Summary (Last 24 hours) at 1/26/2020 1249  Last data filed at 1/26/2020 0945  Gross per 24 hour   Intake 600 ml   Output 1850 ml   Net -1250 ml       Lines/Drains/Airways     Drain            Female External Urinary Catheter 01/24/20 2123 1 day          Peripheral Intravenous Line                 Peripheral IV - Single Lumen 01/24/20 22 G Left Forearm 2 days                Physical Exam   Constitutional: No distress.   HENT:   Head: Normocephalic and atraumatic.   Eyes: Pupils are equal, round, and reactive to light. Conjunctivae are normal.   Neck: Neck supple. No JVD present.   Cardiovascular: Exam reveals no gallop and no friction rub.   No murmur heard.  Distant heart sounds   Pulmonary/Chest: No respiratory distress. She has no wheezes.   diminished basilar breath sounds   Abdominal: Soft. Bowel sounds are  normal. She exhibits distension. There is no tenderness. There is no rebound.   Musculoskeletal: She exhibits no edema or deformity.   Neurological: She is alert. No cranial nerve deficit. Coordination normal.   Skin: Skin is warm and dry. No rash noted. She is not diaphoretic. No erythema.   Psychiatric: She has a normal mood and affect. Her behavior is normal.       Significant Labs:   BMP:   Recent Labs   Lab 01/25/20 0318 01/25/20  0901 01/26/20  0348   * 210* 174*    138 139   K 4.1 4.3 4.0    103 103   CO2 29 28 28   BUN 12 11 10   CREATININE 0.7 0.7 0.7   CALCIUM 8.3* 8.2* 8.3*   , CMP   Recent Labs   Lab 01/25/20 0318 01/25/20  0901 01/26/20  0348    138 139   K 4.1 4.3 4.0    103 103   CO2 29 28 28   * 210* 174*   BUN 12 11 10   CREATININE 0.7 0.7 0.7   CALCIUM 8.3* 8.2* 8.3*   PROT  --  5.2*  --    ALBUMIN  --  2.4*  --    BILITOT  --  1.0  --    ALKPHOS  --  79  --    AST  --  17  --    ALT  --  25  --    ANIONGAP 6* 7* 8   ESTGFRAFRICA >60 >60 >60   EGFRNONAA >60 >60 >60    and CBC   Recent Labs   Lab 01/25/20 0318 01/26/20  0348   WBC 6.00 6.41   HGB 9.6* 10.7*   HCT 31.4* 34.0*    171       Significant Imaging: CT scan: CT ABDOMEN PELVIS WITH CONTRAST: No results found for this visit on 01/24/20. and CT ABDOMEN PELVIS WITHOUT CONTRAST: No results found for this visit on 01/24/20. and Echocardiogram:   2D echo with color flow doppler: No results found for this or any previous visit. and Transthoracic echo (TTE) complete (Cupid Only):   Results for orders placed or performed in visit on 01/24/20   Echo Color Flow Doppler? Yes   Result Value Ref Range    TDI SEPTAL 0.03 m/s    LV LATERAL E/E' RATIO 17.50 m/s    LV SEPTAL E/E' RATIO 23.33 m/s    LA WIDTH 3.70 cm    AORTIC VALVE CUSP SEPERATION 2 cm    TDI LATERAL 0.04 m/s    PV PEAK VELOCITY 1.04 cm/s    LVIDD 4.70 3.5 - 6.0 cm    IVS 1.80 0.6 - 1.1 cm    PW 1.00 0.6 - 1.1 cm    LVIDS 3.40 2.1 - 4.0 cm    FS  "28 28 - 44 %    IVC proximal 2.4 cm    LA volume 33.33 cm3    Sinus 2.90 cm    STJ 2.60 cm    Ascending aorta 3.30 cm    LV mass 265.22 g    LA size 3.00 cm    RVDD 2.40 cm    TAPSE 1.80 cm    Left Ventricle Relative Wall Thickness 0.43 cm    AV mean gradient 10 mmHg    AV valve area 1.42 cm2    AV Velocity Ratio 0.50     AV index (prosthetic) 0.50     MV valve area p 1/2 method 2.53 cm2    E/A ratio 0.67     Mean e' 0.04 m/s    E wave decelartion time 290.00 msec    IVRT 138.00 msec    MV "A" wave duration 161.00 msec    LVOT diameter 1.90 cm    LVOT area 2.8 cm2    LVOT peak cristobal 1.1 m/s    LVOT peak VTI 19.80 cm    Ao peak cristobal 2.2 m/s    Ao VTI 39.60 cm    LVOT stroke volume 56.11 cm3    AV peak gradient 19 mmHg    E/E' ratio 20.00 m/s    MV Peak E Cristobal 0.70 m/s    TR Max Cristobal 2.92 m/s    MV stenosis pressure 1/2 time 87 ms    MV Peak A Cristobal 1.05 m/s    RA Major Axis 3.80 cm    Left Atrium Minor Axis 3.30 cm    Left Atrium Major Axis 3.80 cm    Triscuspid Valve Regurgitation Peak Gradient 34 mmHg    RA Width 3.50 cm    Right Atrial Pressure (from IVC) 15 mmHg    TV rest pulmonary artery pressure 49 mmHg    Narrative    · Normal left ventricular systolic function. The estimated ejection   fraction is 55%.  · Concentric left ventricular hypertrophy.  · Grade I (mild) left ventricular diastolic dysfunction consistent with   impaired relaxation.  · No wall motion abnormalities.  · Normal right ventricular systolic function.  · Large circumferential pericardial effusion. ~ 2.0-3.0 . min RA/RV   collapse on diastole. NO respiratory variation. pre-tamponade physiology  · Pulmonary hypertension present.  · The estimated PA systolic pressure is 49 mmHg.  · Elevated central venous pressure (15 mmHg).  · There is a left pleural effusion.  · Mild aortic valve stenosis.  · Aortic valve area is 1.42 cm2; peak velocity is 2.2 m/s; mean gradient   is 10 mmHg.        Assessment and Plan:     Brief HPI: .    Postsurgical cardiac " pacemaker in situ  For CHB    Paroxysmal atrial fibrillation  On Eliquis  Hold Eliquis for anticipation for Pericardiocentesis      Pericardial effusion  Large pericardial effusion with early tamponade physiology/ RA collapse.   BP is stable    Patient with underlying pulmonary HTN hence full blown chamber collapse can be masked somewhat.     Continue to hold Eliquis    Will plan for pericardiocentesis tomorrow, unless patient clinical status changes and requires urgent intervention.    Etiology is unclear ? viral illness. Will obtain records from [facility].   Also obtain the pleural fluid stud report and pulmonary notes from [place].     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 Will need to follow up with endo as outpatient.             Hypertension  . Continue Coreg         VTE Risk Mitigation (From admission, onward)         Ordered     Place sequential compression device  Until discontinued      01/25/20 1210     IP VTE LOW RISK PATIENT  Once      01/24/20 1919     Place LASHANDA hose  Until discontinued      01/24/20 1919     Place sequential compression device  Until discontinued      01/24/20 1919                Vadim Good MD  Cardiology  [facility]

## 2020-01-26 NOTE — NURSING
Dr. Good notified of patient's c/o shortness of breath with saturation 94% on RA and placed patient on 2L NC with saturation increased to 98%; new order for oxygen received.

## 2020-01-26 NOTE — PLAN OF CARE
VN cued into patients room for rounding. Patient asleep at this time; no acute distress noted. Son at bedside and VN role explained and informed that VN will be working alongside the bedside care team throughout the day. Verbalized understanding that VN is available for any questions and education, and nurse and PCT will continue hourly rounding at bedside. Son aware about plan for pericardicentesis procedure tomorrow.  Allotted time given for questions - all questions answered. Will continue to monitor and intervene PRN.

## 2020-01-26 NOTE — SUBJECTIVE & OBJECTIVE
Interval History:     Review of Systems   Unable to perform ROS: dementia     Objective:     Vital Signs (Most Recent):  Temp: 99.2 °F (37.3 °C) (01/26/20 1217)  Pulse: 84 (01/26/20 1217)  Resp: 18 (01/26/20 1217)  BP: (!) 156/73 (01/26/20 1217)  SpO2: (!) 94 % (01/26/20 1217) Vital Signs (24h Range):  Temp:  [97.2 °F (36.2 °C)-99.8 °F (37.7 °C)] 99.2 °F (37.3 °C)  Pulse:  [61-93] 84  Resp:  [16-18] 18  SpO2:  [94 %-100 %] 94 %  BP: (137-174)/(68-79) 156/73     Weight: 67.5 kg (148 lb 14.4 oz)  Body mass index is 23.32 kg/m².     SpO2: (!) 94 %  O2 Device (Oxygen Therapy): room air      Intake/Output Summary (Last 24 hours) at 1/26/2020 1249  Last data filed at 1/26/2020 0945  Gross per 24 hour   Intake 600 ml   Output 1850 ml   Net -1250 ml       Lines/Drains/Airways     Drain            Female External Urinary Catheter 01/24/20 2123 1 day          Peripheral Intravenous Line                 Peripheral IV - Single Lumen 01/24/20 22 G Left Forearm 2 days                Physical Exam   Constitutional: No distress.   HENT:   Head: Normocephalic and atraumatic.   Eyes: Pupils are equal, round, and reactive to light. Conjunctivae are normal.   Neck: Neck supple. No JVD present.   Cardiovascular: Exam reveals no gallop and no friction rub.   No murmur heard.  Distant heart sounds   Pulmonary/Chest: No respiratory distress. She has no wheezes.   diminished basilar breath sounds   Abdominal: Soft. Bowel sounds are normal. She exhibits distension. There is no tenderness. There is no rebound.   Musculoskeletal: She exhibits no edema or deformity.   Neurological: She is alert. No cranial nerve deficit. Coordination normal.   Skin: Skin is warm and dry. No rash noted. She is not diaphoretic. No erythema.   Psychiatric: She has a normal mood and affect. Her behavior is normal.       Significant Labs:   BMP:   Recent Labs   Lab 01/25/20  0318 01/25/20  0901 01/26/20  0348   * 210* 174*    138 139   K 4.1 4.3 4.0  "   103 103   CO2 29 28 28   BUN 12 11 10   CREATININE 0.7 0.7 0.7   CALCIUM 8.3* 8.2* 8.3*   , CMP   Recent Labs   Lab 01/25/20  0318 01/25/20  0901 01/26/20  0348    138 139   K 4.1 4.3 4.0    103 103   CO2 29 28 28   * 210* 174*   BUN 12 11 10   CREATININE 0.7 0.7 0.7   CALCIUM 8.3* 8.2* 8.3*   PROT  --  5.2*  --    ALBUMIN  --  2.4*  --    BILITOT  --  1.0  --    ALKPHOS  --  79  --    AST  --  17  --    ALT  --  25  --    ANIONGAP 6* 7* 8   ESTGFRAFRICA >60 >60 >60   EGFRNONAA >60 >60 >60    and CBC   Recent Labs   Lab 01/25/20 0318 01/26/20  0348   WBC 6.00 6.41   HGB 9.6* 10.7*   HCT 31.4* 34.0*    171       Significant Imaging: CT scan: CT ABDOMEN PELVIS WITH CONTRAST: No results found for this visit on 01/24/20. and CT ABDOMEN PELVIS WITHOUT CONTRAST: No results found for this visit on 01/24/20. and Echocardiogram:   2D echo with color flow doppler: No results found for this or any previous visit. and Transthoracic echo (TTE) complete (Cupid Only):   Results for orders placed or performed in visit on 01/24/20   Echo Color Flow Doppler? Yes   Result Value Ref Range    TDI SEPTAL 0.03 m/s    LV LATERAL E/E' RATIO 17.50 m/s    LV SEPTAL E/E' RATIO 23.33 m/s    LA WIDTH 3.70 cm    AORTIC VALVE CUSP SEPERATION 2 cm    TDI LATERAL 0.04 m/s    PV PEAK VELOCITY 1.04 cm/s    LVIDD 4.70 3.5 - 6.0 cm    IVS 1.80 0.6 - 1.1 cm    PW 1.00 0.6 - 1.1 cm    LVIDS 3.40 2.1 - 4.0 cm    FS 28 28 - 44 %    IVC proximal 2.4 cm    LA volume 33.33 cm3    Sinus 2.90 cm    STJ 2.60 cm    Ascending aorta 3.30 cm    LV mass 265.22 g    LA size 3.00 cm    RVDD 2.40 cm    TAPSE 1.80 cm    Left Ventricle Relative Wall Thickness 0.43 cm    AV mean gradient 10 mmHg    AV valve area 1.42 cm2    AV Velocity Ratio 0.50     AV index (prosthetic) 0.50     MV valve area p 1/2 method 2.53 cm2    E/A ratio 0.67     Mean e' 0.04 m/s    E wave decelartion time 290.00 msec    IVRT 138.00 msec    MV "A" wave duration " 161.00 msec    LVOT diameter 1.90 cm    LVOT area 2.8 cm2    LVOT peak cristobal 1.1 m/s    LVOT peak VTI 19.80 cm    Ao peak cristobal 2.2 m/s    Ao VTI 39.60 cm    LVOT stroke volume 56.11 cm3    AV peak gradient 19 mmHg    E/E' ratio 20.00 m/s    MV Peak E Cristobal 0.70 m/s    TR Max Cristobal 2.92 m/s    MV stenosis pressure 1/2 time 87 ms    MV Peak A Cristobal 1.05 m/s    RA Major Axis 3.80 cm    Left Atrium Minor Axis 3.30 cm    Left Atrium Major Axis 3.80 cm    Triscuspid Valve Regurgitation Peak Gradient 34 mmHg    RA Width 3.50 cm    Right Atrial Pressure (from IVC) 15 mmHg    TV rest pulmonary artery pressure 49 mmHg    Narrative    · Normal left ventricular systolic function. The estimated ejection   fraction is 55%.  · Concentric left ventricular hypertrophy.  · Grade I (mild) left ventricular diastolic dysfunction consistent with   impaired relaxation.  · No wall motion abnormalities.  · Normal right ventricular systolic function.  · Large circumferential pericardial effusion. ~ 2.0-3.0 . min RA/RV   collapse on diastole. NO respiratory variation. pre-tamponade physiology  · Pulmonary hypertension present.  · The estimated PA systolic pressure is 49 mmHg.  · Elevated central venous pressure (15 mmHg).  · There is a left pleural effusion.  · Mild aortic valve stenosis.  · Aortic valve area is 1.42 cm2; peak velocity is 2.2 m/s; mean gradient   is 10 mmHg.

## 2020-01-26 NOTE — PLAN OF CARE
Care plan reviewed with patient, AAOx4, family at bedside, O2 at 1 L/min via nasal canula, states breathing better. Paced per cardiac monitor, no red alarm noted. Blood pressure decreased from 173/71, HR 80 to 140/68 and HR to 75 after administration of labetalol injection 10 mg IV as ordered. Denies any pain of discomfort, education provided on medication effect and side effect, voices understanding. Call light within her reach, no apparent distress noted, bed in low position, bed alarm on, educated on the importance of calling as needed, voices understanding, stable at this time.

## 2020-01-26 NOTE — HOSPITAL COURSE
1/25/2020 Transferred from [place] with pericardial effusion in need for pericardiocentesis. Echo at [facility] with large pericardial effusion with early tamponade physiology/RA collapse. BP stable and Eliquis held.   1/26/2020 Patient is doing well, Had episode of mild resp distress required NC o2. Now of oxygen and doing well.   1/27/2020 NPO for pericardiocentesis today  1/28/2020 Pericardiocentesis yesterday with approximately 920cc removed with fluid sent to lab for analysis. Admitted to ICU with pericardial drain in place. No output overnight per bedside RN with notation of 5-10cc this morning so far. Patient reports breathing improved and able to lie flat overnight. HR and BP stable overnight. Decrease of urine output noted yesterday with NS at 75cc/hr ordered with 750cc out overnight -2.8 liters since admission. Will plan for repeat limited echo today with pericardial drain removal once output decreased/non existent   1/29/2020 Repeat echo yesterday with resolution of pericardial effusion and removal of drain. IR consulted with minimal/trace pleural effusions therefore no repeat thoracentesis needed. Transferred to floor. HR and BP stable. PT and OT on board. Planning to discharge to rehab in [place] once approval obtained   1/30/2020 HR stable overnight. BP 140s-160s/60s overnight. CBC and BMP stable. Complaints of SOB this morning relieved with sitting up and drinking coffee. Culture with staphylococcus hominin on prelim report-will consult ID   01/31/2020 Hemodynamically stable, afebrile. No further SOB. Blood cultures obtained per ID recs. Patient ambulating in carr with assistance.

## 2020-01-27 ENCOUNTER — TELEPHONE (OUTPATIENT)
Dept: CARDIOLOGY | Facility: CLINIC | Age: 80
End: 2020-01-27

## 2020-01-27 LAB
ANION GAP SERPL CALC-SCNC: 5 MMOL/L (ref 8–16)
APPEARANCE FLD: NORMAL
BASOPHILS # BLD AUTO: 0.01 K/UL (ref 0–0.2)
BASOPHILS NFR BLD: 0.1 % (ref 0–1.9)
BODY FLD TYPE: NORMAL
BUN SERPL-MCNC: 9 MG/DL (ref 8–23)
CALCIUM SERPL-MCNC: 8.2 MG/DL (ref 8.7–10.5)
CHLORIDE SERPL-SCNC: 103 MMOL/L (ref 95–110)
CO2 SERPL-SCNC: 31 MMOL/L (ref 23–29)
COLOR FLD: NORMAL
CREAT SERPL-MCNC: 0.7 MG/DL (ref 0.5–1.4)
DIFFERENTIAL METHOD: ABNORMAL
EOSINOPHIL # BLD AUTO: 0.1 K/UL (ref 0–0.5)
EOSINOPHIL NFR BLD: 1 % (ref 0–8)
ERYTHROCYTE [DISTWIDTH] IN BLOOD BY AUTOMATED COUNT: 14.7 % (ref 11.5–14.5)
EST. GFR  (AFRICAN AMERICAN): >60 ML/MIN/1.73 M^2
EST. GFR  (NON AFRICAN AMERICAN): >60 ML/MIN/1.73 M^2
GLUCOSE SERPL-MCNC: 125 MG/DL (ref 70–110)
GRAM STN SPEC: NORMAL
GRAM STN SPEC: NORMAL
HCT VFR BLD AUTO: 34.1 % (ref 37–48.5)
HGB BLD-MCNC: 10.8 G/DL (ref 12–16)
LYMPHOCYTES # BLD AUTO: 1.7 K/UL (ref 1–4.8)
LYMPHOCYTES NFR BLD: 23.6 % (ref 18–48)
LYMPHOCYTES NFR FLD MANUAL: 67 %
MCH RBC QN AUTO: 28.9 PG (ref 27–31)
MCHC RBC AUTO-ENTMCNC: 31.7 G/DL (ref 32–36)
MCV RBC AUTO: 91 FL (ref 82–98)
MONOCYTES # BLD AUTO: 0.9 K/UL (ref 0.3–1)
MONOCYTES NFR BLD: 13 % (ref 4–15)
MONOS+MACROS NFR FLD MANUAL: 7 %
NEUTROPHILS # BLD AUTO: 4.4 K/UL (ref 1.8–7.7)
NEUTROPHILS NFR BLD: 62.3 % (ref 38–73)
NEUTROPHILS NFR FLD MANUAL: 26 %
PLATELET # BLD AUTO: 180 K/UL (ref 150–350)
PMV BLD AUTO: 12.4 FL (ref 9.2–12.9)
POCT GLUCOSE: 120 MG/DL (ref 70–110)
POCT GLUCOSE: 125 MG/DL (ref 70–110)
POCT GLUCOSE: 137 MG/DL (ref 70–110)
POTASSIUM SERPL-SCNC: 3.9 MMOL/L (ref 3.5–5.1)
RBC # BLD AUTO: 3.74 M/UL (ref 4–5.4)
SODIUM SERPL-SCNC: 139 MMOL/L (ref 136–145)
WBC # BLD AUTO: 7.13 K/UL (ref 3.9–12.7)
WBC # FLD: 1295 /CU MM

## 2020-01-27 PROCEDURE — 89051 BODY FLUID CELL COUNT: CPT

## 2020-01-27 PROCEDURE — 99152 MOD SED SAME PHYS/QHP 5/>YRS: CPT | Mod: ,,, | Performed by: INTERNAL MEDICINE

## 2020-01-27 PROCEDURE — 80048 BASIC METABOLIC PNL TOTAL CA: CPT

## 2020-01-27 PROCEDURE — 25000003 PHARM REV CODE 250: Performed by: NURSE PRACTITIONER

## 2020-01-27 PROCEDURE — 85025 COMPLETE CBC W/AUTO DIFF WBC: CPT

## 2020-01-27 PROCEDURE — 20000000 HC ICU ROOM

## 2020-01-27 PROCEDURE — 88305 TISSUE EXAM BY PATHOLOGIST: CPT | Performed by: PATHOLOGY

## 2020-01-27 PROCEDURE — 63600175 PHARM REV CODE 636 W HCPCS: Performed by: INTERNAL MEDICINE

## 2020-01-27 PROCEDURE — 88305 TISSUE EXAM BY PATHOLOGIST: CPT | Mod: 26,,, | Performed by: PATHOLOGY

## 2020-01-27 PROCEDURE — 87206 SMEAR FLUORESCENT/ACID STAI: CPT

## 2020-01-27 PROCEDURE — 87496 CYTOMEG DNA AMP PROBE: CPT

## 2020-01-27 PROCEDURE — C1729 CATH, DRAINAGE: HCPCS | Performed by: INTERNAL MEDICINE

## 2020-01-27 PROCEDURE — 27201423 OPTIME MED/SURG SUP & DEVICES STERILE SUPPLY: Performed by: INTERNAL MEDICINE

## 2020-01-27 PROCEDURE — 83605 ASSAY OF LACTIC ACID: CPT

## 2020-01-27 PROCEDURE — 33016 PERICARDIOCENTESIS W/IMAGING: CPT | Mod: ,,, | Performed by: INTERNAL MEDICINE

## 2020-01-27 PROCEDURE — 83615 LACTATE (LD) (LDH) ENZYME: CPT

## 2020-01-27 PROCEDURE — 87205 SMEAR GRAM STAIN: CPT

## 2020-01-27 PROCEDURE — 88305 TISSUE EXAM BY PATHOLOGIST: ICD-10-PCS | Mod: 26,,, | Performed by: PATHOLOGY

## 2020-01-27 PROCEDURE — 25000003 PHARM REV CODE 250: Performed by: INTERNAL MEDICINE

## 2020-01-27 PROCEDURE — 87205 SMEAR GRAM STAIN: CPT | Mod: 59

## 2020-01-27 PROCEDURE — 84157 ASSAY OF PROTEIN OTHER: CPT

## 2020-01-27 PROCEDURE — 87116 MYCOBACTERIA CULTURE: CPT

## 2020-01-27 PROCEDURE — 82945 GLUCOSE OTHER FLUID: CPT

## 2020-01-27 PROCEDURE — 99152 PR MOD CONSCIOUS SEDATION, SAME PHYS, 5+ YRS, FIRST 15 MIN: ICD-10-PCS | Mod: ,,, | Performed by: INTERNAL MEDICINE

## 2020-01-27 PROCEDURE — 99152 MOD SED SAME PHYS/QHP 5/>YRS: CPT | Performed by: INTERNAL MEDICINE

## 2020-01-27 PROCEDURE — 88112 PR  CYTOPATH, CELL ENHANCE TECH: ICD-10-PCS | Mod: 26,,, | Performed by: PATHOLOGY

## 2020-01-27 PROCEDURE — 87186 SC STD MICRODIL/AGAR DIL: CPT

## 2020-01-27 PROCEDURE — 82150 ASSAY OF AMYLASE: CPT

## 2020-01-27 PROCEDURE — 88112 CYTOPATH CELL ENHANCE TECH: CPT | Mod: 26,,, | Performed by: PATHOLOGY

## 2020-01-27 PROCEDURE — 87070 CULTURE OTHR SPECIMN AEROBIC: CPT

## 2020-01-27 PROCEDURE — 83986 ASSAY PH BODY FLUID NOS: CPT

## 2020-01-27 PROCEDURE — 33016 PERICARDIOCENTESIS W/IMAGING: CPT | Performed by: INTERNAL MEDICINE

## 2020-01-27 PROCEDURE — 36415 COLL VENOUS BLD VENIPUNCTURE: CPT

## 2020-01-27 PROCEDURE — 33016 PR PERICARDIOCENTESIS, W/IMG GUIDANCE: ICD-10-PCS | Mod: ,,, | Performed by: INTERNAL MEDICINE

## 2020-01-27 PROCEDURE — 87102 FUNGUS ISOLATION CULTURE: CPT

## 2020-01-27 PROCEDURE — 93005 ELECTROCARDIOGRAM TRACING: CPT

## 2020-01-27 PROCEDURE — C1894 INTRO/SHEATH, NON-LASER: HCPCS | Performed by: INTERNAL MEDICINE

## 2020-01-27 PROCEDURE — 94761 N-INVAS EAR/PLS OXIMETRY MLT: CPT

## 2020-01-27 PROCEDURE — 87077 CULTURE AEROBIC IDENTIFY: CPT

## 2020-01-27 PROCEDURE — 88112 CYTOPATH CELL ENHANCE TECH: CPT | Performed by: PATHOLOGY

## 2020-01-27 RX ORDER — FENTANYL CITRATE 50 UG/ML
INJECTION, SOLUTION INTRAMUSCULAR; INTRAVENOUS
Status: DISCONTINUED | OUTPATIENT
Start: 2020-01-27 | End: 2020-01-27 | Stop reason: HOSPADM

## 2020-01-27 RX ORDER — MIDAZOLAM HYDROCHLORIDE 1 MG/ML
INJECTION INTRAMUSCULAR; INTRAVENOUS
Status: DISCONTINUED | OUTPATIENT
Start: 2020-01-27 | End: 2020-01-27 | Stop reason: HOSPADM

## 2020-01-27 RX ORDER — ONDANSETRON 2 MG/ML
4 INJECTION INTRAMUSCULAR; INTRAVENOUS EVERY 12 HOURS PRN
Status: DISCONTINUED | OUTPATIENT
Start: 2020-01-27 | End: 2020-02-03 | Stop reason: HOSPADM

## 2020-01-27 RX ORDER — SODIUM CHLORIDE 9 MG/ML
INJECTION, SOLUTION INTRAVENOUS CONTINUOUS
Status: DISCONTINUED | OUTPATIENT
Start: 2020-01-27 | End: 2020-01-28

## 2020-01-27 RX ORDER — ACETAMINOPHEN 325 MG/1
650 TABLET ORAL EVERY 4 HOURS PRN
Status: DISCONTINUED | OUTPATIENT
Start: 2020-01-27 | End: 2020-01-30

## 2020-01-27 RX ADMIN — SODIUM CHLORIDE: 0.9 INJECTION, SOLUTION INTRAVENOUS at 09:01

## 2020-01-27 RX ADMIN — DULOXETINE 30 MG: 30 CAPSULE, DELAYED RELEASE ORAL at 09:01

## 2020-01-27 RX ADMIN — LABETALOL HYDROCHLORIDE 10 MG: 5 INJECTION INTRAVENOUS at 04:01

## 2020-01-27 RX ADMIN — GABAPENTIN 300 MG: 300 CAPSULE ORAL at 08:01

## 2020-01-27 RX ADMIN — CARVEDILOL 6.25 MG: 6.25 TABLET, FILM COATED ORAL at 09:01

## 2020-01-27 RX ADMIN — CLOPIDOGREL BISULFATE 75 MG: 75 TABLET, FILM COATED ORAL at 09:01

## 2020-01-27 RX ADMIN — GABAPENTIN 300 MG: 300 CAPSULE ORAL at 04:01

## 2020-01-27 RX ADMIN — LABETALOL HYDROCHLORIDE 10 MG: 5 INJECTION INTRAVENOUS at 05:01

## 2020-01-27 RX ADMIN — CARVEDILOL 6.25 MG: 6.25 TABLET, FILM COATED ORAL at 08:01

## 2020-01-27 RX ADMIN — GABAPENTIN 300 MG: 300 CAPSULE ORAL at 09:01

## 2020-01-27 RX ADMIN — PANTOPRAZOLE SODIUM 40 MG: 40 TABLET, DELAYED RELEASE ORAL at 09:01

## 2020-01-27 NOTE — PLAN OF CARE
Visit with pt for d/c planning.  Pt lives at home with spouse, independent with ADL's with use of DME only as needed.  Pt was previously at [facility] for skilled nursing after hospitalization at [facility]. Pt here for pericardiocentesis today. Plans at time of d/c is to return to [facility] bed for skilled nursing if still needed.  Pt will need PT/OT after procedure to assess further need for skilled.  [employer] will require insurance auth.    At time of d/c home, pt will have spouse Ruslan and additional family members at home.  Pt was current with [facility]  prior to admission.      Discharge planning brochure provided. White board updated with CM name & contact info.  Pt encouraged to call with any questions or needs. CM will continue to follow patient throughout the transitions of care, and assist with any discharge needs.       01/27/20 1425   Discharge Assessment   Assessment Type Discharge Planning Assessment   Confirmed/corrected address and phone number on facesheet? Yes   Assessment information obtained from? Patient   Expected Length of Stay (days) 2   Communicated expected length of stay with patient/caregiver yes   Prior to hospitilization cognitive status: Alert/Oriented   Prior to hospitalization functional status: Assistive Equipment;Needs Assistance   Current cognitive status: Alert/Oriented   Lives With child(aletha), adult   Able to Return to Prior Arrangements yes   Is patient able to care for self after discharge? Yes   Who are your caregiver(s) and their phone number(s)?   (spouse PIETRO [phone])   Readmission Within the Last 30 Days no previous admission in last 30 days   Equipment Currently Used at Home wheelchair;walker, rolling;cane, quad;shower chair   Do you have any problems affording any of your prescribed medications? No   Is the patient taking medications as prescribed? yes   Does the patient have transportation home? Yes   Transportation Anticipated family or friend  will provide   Does the patient receive services at the [facility]? No   Discharge Plan A Skilled Nursing Facility   DME Needed Upon Discharge  none   Patient/Family in Agreement with Plan yes

## 2020-01-27 NOTE — BRIEF OP NOTE
- Pericardiocentesis    Sub xyphoid approach  Echo guided.   Micropuncture needle advanced without any resistance. Intrapericardial space was confirmed with bubble injection. Then catheter was advanced and a total of 920 cc of serosangeous pericardial fluid drained. Catheter was secured in place and connected to negative drain.  Echo done showed improvement of pericardial fluid, minimal residual. Pleural effusion present.       Patient tolerated the procedure well without any complication.   Transferred to the ICU/ drain in place.      Fluid sent for analysis

## 2020-01-27 NOTE — PLAN OF CARE
Report given to ICU nurse.  Pt will go to ICU after cath lab.  Pt belongings brought down to ICU.

## 2020-01-28 LAB
ANION GAP SERPL CALC-SCNC: 8 MMOL/L (ref 8–16)
BASOPHILS # BLD AUTO: 0.01 K/UL (ref 0–0.2)
BASOPHILS NFR BLD: 0.1 % (ref 0–1.9)
BUN SERPL-MCNC: 12 MG/DL (ref 8–23)
CALCIUM SERPL-MCNC: 8.3 MG/DL (ref 8.7–10.5)
CHLORIDE SERPL-SCNC: 103 MMOL/L (ref 95–110)
CO2 SERPL-SCNC: 27 MMOL/L (ref 23–29)
CREAT SERPL-MCNC: 0.6 MG/DL (ref 0.5–1.4)
CV ECHO LV RWT: 0.42 CM
DIFFERENTIAL METHOD: ABNORMAL
DOP CALC LVOT AREA: 3.1 CM2
DOP CALC LVOT DIAMETER: 2 CM
ECHO LV POSTERIOR WALL: 1 CM (ref 0.6–1.1)
EOSINOPHIL # BLD AUTO: 0.1 K/UL (ref 0–0.5)
EOSINOPHIL NFR BLD: 1.1 % (ref 0–8)
ERYTHROCYTE [DISTWIDTH] IN BLOOD BY AUTOMATED COUNT: 14.7 % (ref 11.5–14.5)
EST. GFR  (AFRICAN AMERICAN): >60 ML/MIN/1.73 M^2
EST. GFR  (NON AFRICAN AMERICAN): >60 ML/MIN/1.73 M^2
FRACTIONAL SHORTENING: 25 % (ref 28–44)
GLUCOSE SERPL-MCNC: 146 MG/DL (ref 70–110)
HCT VFR BLD AUTO: 36.7 % (ref 37–48.5)
HGB BLD-MCNC: 11.5 G/DL (ref 12–16)
INTERVENTRICULAR SEPTUM: 1.5 CM (ref 0.6–1.1)
LEFT ATRIUM SIZE: 2.1 CM
LEFT INTERNAL DIMENSION IN SYSTOLE: 3.6 CM (ref 2.1–4)
LEFT VENTRICLE MASS INDEX: 131 G/M2
LEFT VENTRICULAR INTERNAL DIMENSION IN DIASTOLE: 4.8 CM (ref 3.5–6)
LEFT VENTRICULAR MASS: 232.25 G
LYMPHOCYTES # BLD AUTO: 1.8 K/UL (ref 1–4.8)
LYMPHOCYTES NFR BLD: 21.7 % (ref 18–48)
MCH RBC QN AUTO: 28.3 PG (ref 27–31)
MCHC RBC AUTO-ENTMCNC: 31.3 G/DL (ref 32–36)
MCV RBC AUTO: 90 FL (ref 82–98)
MONOCYTES # BLD AUTO: 1.2 K/UL (ref 0.3–1)
MONOCYTES NFR BLD: 14.4 % (ref 4–15)
NEUTROPHILS # BLD AUTO: 5.2 K/UL (ref 1.8–7.7)
NEUTROPHILS NFR BLD: 62.7 % (ref 38–73)
PLATELET # BLD AUTO: 190 K/UL (ref 150–350)
PMV BLD AUTO: 12.1 FL (ref 9.2–12.9)
POCT GLUCOSE: 124 MG/DL (ref 70–110)
POCT GLUCOSE: 134 MG/DL (ref 70–110)
POCT GLUCOSE: 175 MG/DL (ref 70–110)
POCT GLUCOSE: 203 MG/DL (ref 70–110)
POTASSIUM SERPL-SCNC: 3.8 MMOL/L (ref 3.5–5.1)
RBC # BLD AUTO: 4.06 M/UL (ref 4–5.4)
SODIUM SERPL-SCNC: 138 MMOL/L (ref 136–145)
WBC # BLD AUTO: 8.33 K/UL (ref 3.9–12.7)

## 2020-01-28 PROCEDURE — 25000003 PHARM REV CODE 250: Performed by: INTERNAL MEDICINE

## 2020-01-28 PROCEDURE — 63600175 PHARM REV CODE 636 W HCPCS: Performed by: NURSE PRACTITIONER

## 2020-01-28 PROCEDURE — 36415 COLL VENOUS BLD VENIPUNCTURE: CPT

## 2020-01-28 PROCEDURE — 99233 PR SUBSEQUENT HOSPITAL CARE,LEVL III: ICD-10-PCS | Mod: ,,, | Performed by: INTERNAL MEDICINE

## 2020-01-28 PROCEDURE — 11000001 HC ACUTE MED/SURG PRIVATE ROOM

## 2020-01-28 PROCEDURE — 94761 N-INVAS EAR/PLS OXIMETRY MLT: CPT

## 2020-01-28 PROCEDURE — 99233 SBSQ HOSP IP/OBS HIGH 50: CPT | Mod: ,,, | Performed by: INTERNAL MEDICINE

## 2020-01-28 PROCEDURE — 85025 COMPLETE CBC W/AUTO DIFF WBC: CPT

## 2020-01-28 PROCEDURE — 80048 BASIC METABOLIC PNL TOTAL CA: CPT

## 2020-01-28 PROCEDURE — 25000003 PHARM REV CODE 250: Performed by: NURSE PRACTITIONER

## 2020-01-28 RX ORDER — HEPARIN SODIUM 5000 [USP'U]/ML
5000 INJECTION, SOLUTION INTRAVENOUS; SUBCUTANEOUS EVERY 8 HOURS
Status: COMPLETED | OUTPATIENT
Start: 2020-01-28 | End: 2020-01-28

## 2020-01-28 RX ADMIN — CLOPIDOGREL BISULFATE 75 MG: 75 TABLET, FILM COATED ORAL at 08:01

## 2020-01-28 RX ADMIN — HEPARIN SODIUM 5000 UNITS: 5000 INJECTION, SOLUTION INTRAVENOUS; SUBCUTANEOUS at 09:01

## 2020-01-28 RX ADMIN — GABAPENTIN 300 MG: 300 CAPSULE ORAL at 09:01

## 2020-01-28 RX ADMIN — INSULIN ASPART 2 UNITS: 100 INJECTION, SOLUTION INTRAVENOUS; SUBCUTANEOUS at 11:01

## 2020-01-28 RX ADMIN — CARVEDILOL 6.25 MG: 6.25 TABLET, FILM COATED ORAL at 08:01

## 2020-01-28 RX ADMIN — GABAPENTIN 300 MG: 300 CAPSULE ORAL at 08:01

## 2020-01-28 RX ADMIN — PANTOPRAZOLE SODIUM 40 MG: 40 TABLET, DELAYED RELEASE ORAL at 08:01

## 2020-01-28 RX ADMIN — Medication 6 MG: at 09:01

## 2020-01-28 RX ADMIN — CARVEDILOL 6.25 MG: 6.25 TABLET, FILM COATED ORAL at 09:01

## 2020-01-28 RX ADMIN — DULOXETINE 30 MG: 30 CAPSULE, DELAYED RELEASE ORAL at 08:01

## 2020-01-28 RX ADMIN — GABAPENTIN 300 MG: 300 CAPSULE ORAL at 02:01

## 2020-01-28 RX ADMIN — HEPARIN SODIUM 5000 UNITS: 5000 INJECTION, SOLUTION INTRAVENOUS; SUBCUTANEOUS at 03:01

## 2020-01-28 NOTE — PT/OT/SLP PROGRESS
Occupational Therapy  Visit Attempt     Patient Name:  Dori Whatley   MRN:  [id number]    Patient not seen this PM secondary to thoracentesis procedure being set up at bedside   . Will follow-up tomorrow's date.    Keena Tomlinson, OT  1/28/2020

## 2020-01-28 NOTE — PLAN OF CARE
Patient in the ICU LOS 4 days. Patient states lives in [facility] with her , however she has been at [facility] for therapy, she states she hurt her shoulder and was also having problems walking. She states  She would like to return to [facility] to continue therapy when ready for discharge.     01/28/20 1321   Discharge Reassessment   Assessment Type Discharge Planning Assessment   Provided patient/caregiver education on the expected discharge date and the discharge plan Yes   Do you have any problems affording any of your prescribed medications? No   Discharge Plan A Skilled Nursing Facility   Discharge Plan B Home with family;Home Health   DME Needed Upon Discharge  none   Patient choice form signed by patient/caregiver N/A   Anticipated Discharge Disposition SNF   Can the patient answer the patient profile reliably? Yes, cognitively intact   How does the patient rate their overall health at the present time? Fair   Describe the patient's ability to walk at the present time. Minor restrictions or changes   How often would a person be available to care for the patient? Occasionally   Number of comorbid conditions (as recorded on the chart) Four   During the past month, has the patient often been bothered by feeling down, depressed or hopeless? No   During the past month, has the patient often been bothered by little interest or pleasure in doing things? No   Post-Acute Status   Discharge Delays None known at this time

## 2020-01-28 NOTE — NURSING
Pt complaining of pressure near pericardial drain. Notified INOCENCIO Be. NP stated to strip drain tubing. NP to notify Dr. Good.

## 2020-01-28 NOTE — ASSESSMENT & PLAN NOTE
-echocardiogram with large pericardial effusion with early tamponade physiology/ RA collapse; no hemodynamic instability present; transferred to [facility] for further treatment/management  -pericardiocentesis yesterday with approximately 920cc removed with pericardial drain left in place  -no output overnight per chart documentation with approximately 5-10cc noted to bulb this AM  -HR and BP stable; SOB improving per patient  -will plan to repeat limited echocardiogram with removal of drain once effusion completely resolved and output remains minimal   -will continue to hold Eliquis; would ideally like to place on DVT prophylaxis with Heparin but will hold off until drain removed.   -pericardial fluid sent to lab for analysis-will follow results nce full blown chamber collapse can be masked somewhat.

## 2020-01-28 NOTE — NURSING
MD Sarabiasef notified at this time of no PIV access as well as 200 mL UOP (dark, concentrated) since admission to ICU. Multiple IV attempts per 2 RNs. Order for 75 mL/hr of NS as well as anesthesia consult for PIV placement.

## 2020-01-28 NOTE — PLAN OF CARE
Adult Inpatient Plan of Care  Goal: Plan of Care Review  Outcome: Ongoing, Progressing    A&Ox4. Vital signs stable. Pericardial drain removed per MD this afternoon; minimal output. Purewick in place; adequate urine output. Repositioned frequently. Safety precautions in place. Plan of care reviewed with patient and spouse. Plans for thoracentesis this afternoon. Tele transfer orders in place.

## 2020-01-28 NOTE — PT/OT/SLP PROGRESS
Physical Therapy  Eval Attempt    Patient Name:  Dori Whatley   MRN:  [id number]    Patient not seen today secondary to IR procedure being set up at bedside. Will follow-up tomorrow's date.    Yamilet Phillip, PT   1/28/2020

## 2020-01-28 NOTE — SUBJECTIVE & OBJECTIVE
Review of Systems   Constitution: Negative for chills, decreased appetite, diaphoresis, fever, malaise/fatigue, weight gain and weight loss.   Cardiovascular: Positive for dyspnea on exertion (improving ). Negative for chest pain, claudication, irregular heartbeat, leg swelling, near-syncope, orthopnea, palpitations and paroxysmal nocturnal dyspnea.   Respiratory: Negative for cough, shortness of breath, snoring, sputum production and wheezing.    Endocrine: Negative for cold intolerance, heat intolerance, polydipsia, polyphagia and polyuria.   Skin: Negative for color change, dry skin, itching, nail changes and poor wound healing.   Musculoskeletal: Negative for back pain, gout, joint pain and joint swelling.   Gastrointestinal: Negative for bloating, abdominal pain, constipation, diarrhea, hematemesis, hematochezia, melena, nausea and vomiting.   Genitourinary: Negative for dysuria, hematuria and nocturia.   Neurological: Negative for dizziness, headaches, light-headedness, numbness, paresthesias and weakness.   Psychiatric/Behavioral: Negative for altered mental status, depression and memory loss.     Objective:     Vital Signs (Most Recent):  Temp: 98.5 °F (36.9 °C) (01/28/20 0745)  Pulse: 76 (01/28/20 0840)  Resp: (!) 25 (01/28/20 0840)  BP: (!) 140/63 (01/28/20 0840)  SpO2: (!) 94 % (01/28/20 0840) Vital Signs (24h Range):  Temp:  [97.2 °F (36.2 °C)-98.5 °F (36.9 °C)] 98.5 °F (36.9 °C)  Pulse:  [60-90] 76  Resp:  [12-40] 25  SpO2:  [91 %-96 %] 94 %  BP: (122-179)/() 140/63     Weight: 66.5 kg (146 lb 9.7 oz)  Body mass index is 22.96 kg/m².     SpO2: (!) 94 %  O2 Device (Oxygen Therapy): room air      Intake/Output Summary (Last 24 hours) at 1/28/2020 0903  Last data filed at 1/28/2020 0800  Gross per 24 hour   Intake 1428.75 ml   Output 750 ml   Net 678.75 ml       Lines/Drains/Airways     Drain                 Drain/Device  medial chest -- days    Female External Urinary Catheter 01/24/20 2123 3  days          Peripheral Intravenous Line                 Peripheral IV - Single Lumen 01/27/20 2140 20 G Anterior;Left Forearm less than 1 day                Physical Exam   Constitutional: She is oriented to person, place, and time. She appears well-developed and well-nourished. No distress.   Cardiovascular: Normal rate and regular rhythm. Exam reveals no gallop.   No murmur heard.  Pulmonary/Chest: Effort normal and breath sounds normal. No respiratory distress. She has no wheezes.   Abdominal: Soft. Bowel sounds are normal. She exhibits no distension. There is no tenderness.   Neurological: She is alert and oriented to person, place, and time.   Skin: Skin is warm and dry.   Drain intact to pericardial area with serosangenuous drainage noted        Significant Labs:     Recent Labs   Lab 01/28/20  0317   WBC 8.33   RBC 4.06   HGB 11.5*   HCT 36.7*      MCV 90   MCH 28.3   MCHC 31.3*     Recent Labs   Lab 01/28/20  0317      K 3.8      CO2 27   BUN 12   CREATININE 0.6       Significant Imaging: Echocardiogram:   Transthoracic echo (TTE) complete (Cupid Only):   Results for orders placed or performed during the hospital encounter of 01/24/20   Echo Color Flow Doppler? Yes    Narrative    · Low normal left ventricular systolic function. The estimated ejection   fraction is 50%.  · Large circumferential pericardial effusion. Effusion is fluid.  · Pericardiocentesis performed with resolution of effusion.  · There is a left pleural effusion.

## 2020-01-28 NOTE — PROGRESS NOTES
[facility]  Cardiology  Progress Note    Patient Name: Dori Whatley  MRN: [id number]  Admission Date: 1/24/2020  Hospital Length of Stay: 4 days  Code Status: Full Code   Attending Physician: Vadim Good MD   Primary Care Physician: Tru Sanchez MD  Expected Discharge Date:   Principal Problem:Pericardial effusion    Subjective:     Hospital Course:   1/25/2020 Transferred from [place] with pericardial effusion in need for pericardiocentesis. Echo at [facility] with large pericardial effusion with early tamponade physiology/RA collapse. BP stable and Eliquis held.   1/26/2020 Patient is doing well, Had episode of mild resp distress required NC o2. Now of oxygen and doing well.   1/27/2020 NPO for pericardiocentesis today  1/28/2020 Pericardiocentesis yesterday with approximately 920cc removed with fluid sent to lab for analysis. Admitted to ICU with pericardial drain in place. No output overnight per bedside RN with notation of 5-10cc this morning so far. Patient reports breathing improved and able to lie flat overnight. HR and BP stable overnight. Decrease of urine output noted yesterday with NS at 75cc/hr ordered with 750cc out overnight -2.8 liters since admission. Will plan for repeat limited echo today with pericardial drain removal once output decreased/non existent           Review of Systems   Constitution: Negative for chills, decreased appetite, diaphoresis, fever, malaise/fatigue, weight gain and weight loss.   Cardiovascular: Positive for dyspnea on exertion (improving ). Negative for chest pain, claudication, irregular heartbeat, leg swelling, near-syncope, orthopnea, palpitations and paroxysmal nocturnal dyspnea.   Respiratory: Negative for cough, shortness of breath, snoring, sputum production and wheezing.    Endocrine: Negative for cold intolerance, heat intolerance, polydipsia, polyphagia and polyuria.   Skin: Negative for color change, dry skin, itching, nail  changes and poor wound healing.   Musculoskeletal: Negative for back pain, gout, joint pain and joint swelling.   Gastrointestinal: Negative for bloating, abdominal pain, constipation, diarrhea, hematemesis, hematochezia, melena, nausea and vomiting.   Genitourinary: Negative for dysuria, hematuria and nocturia.   Neurological: Negative for dizziness, headaches, light-headedness, numbness, paresthesias and weakness.   Psychiatric/Behavioral: Negative for altered mental status, depression and memory loss.     Objective:     Vital Signs (Most Recent):  Temp: 98.5 °F (36.9 °C) (01/28/20 0745)  Pulse: 76 (01/28/20 0840)  Resp: (!) 25 (01/28/20 0840)  BP: (!) 140/63 (01/28/20 0840)  SpO2: (!) 94 % (01/28/20 0840) Vital Signs (24h Range):  Temp:  [97.2 °F (36.2 °C)-98.5 °F (36.9 °C)] 98.5 °F (36.9 °C)  Pulse:  [60-90] 76  Resp:  [12-40] 25  SpO2:  [91 %-96 %] 94 %  BP: (122-179)/() 140/63     Weight: 66.5 kg (146 lb 9.7 oz)  Body mass index is 22.96 kg/m².     SpO2: (!) 94 %  O2 Device (Oxygen Therapy): room air      Intake/Output Summary (Last 24 hours) at 1/28/2020 0903  Last data filed at 1/28/2020 0800  Gross per 24 hour   Intake 1428.75 ml   Output 750 ml   Net 678.75 ml       Lines/Drains/Airways     Drain                 Drain/Device  medial chest -- days    Female External Urinary Catheter 01/24/20 2123 3 days          Peripheral Intravenous Line                 Peripheral IV - Single Lumen 01/27/20 2140 20 G Anterior;Left Forearm less than 1 day                Physical Exam   Constitutional: She is oriented to person, place, and time. She appears well-developed and well-nourished. No distress.   Cardiovascular: Normal rate and regular rhythm. Exam reveals no gallop.   No murmur heard.  Pulmonary/Chest: Effort normal and breath sounds normal. No respiratory distress. She has no wheezes.   Abdominal: Soft. Bowel sounds are normal. She exhibits no distension. There is no tenderness.   Neurological: She is  alert and oriented to person, place, and time.   Skin: Skin is warm and dry.   Drain intact to pericardial area with serosangenuous drainage noted        Significant Labs:     Recent Labs   Lab 01/28/20  0317   WBC 8.33   RBC 4.06   HGB 11.5*   HCT 36.7*      MCV 90   MCH 28.3   MCHC 31.3*     Recent Labs   Lab 01/28/20  0317      K 3.8      CO2 27   BUN 12   CREATININE 0.6       Significant Imaging: Echocardiogram:   Transthoracic echo (TTE) complete (Cupid Only):   Results for orders placed or performed during the hospital encounter of 01/24/20   Echo Color Flow Doppler? Yes    Narrative    · Low normal left ventricular systolic function. The estimated ejection   fraction is 50%.  · Large circumferential pericardial effusion. Effusion is fluid.  · Pericardiocentesis performed with resolution of effusion.  · There is a left pleural effusion.        Assessment and Plan:     Brief HPI: Seen on AM rounds with Dr. Good with  at the bedside. Reports breathing is improving. Discussed POC as detailed below-verbalized understanding and agrees with POC     * Pericardial effusion  -echocardiogram with large pericardial effusion with early tamponade physiology/ RA collapse; no hemodynamic instability present; transferred to [facility] for further treatment/management  -pericardiocentesis yesterday with approximately 920cc removed with pericardial drain left in place  -no output overnight per chart documentation with approximately 5-10cc noted to bulb this AM  -HR and BP stable; SOB improving per patient  -will plan to repeat limited echocardiogram with removal of drain once effusion completely resolved and output remains minimal   -will continue to hold Eliquis; would ideally like to place on DVT prophylaxis with Heparin but will hold off until drain removed.   -pericardial fluid sent to lab for analysis-will follow results nce full blown chamber collapse can be masked somewhat.              Postsurgical cardiac pacemaker in situ  -PPM for  CHB  -appropriate pacemaker function noted on telemetry     Paroxysmal atrial fibrillation  -HR 70s-80s overnight with bigeminy noted on telemetry; no RVR noted  -on Eliquis at home but will continue to hold given presence of pericardial drain; will resume oral AC once drain removed   -continue to monitor on telemetry; continue Coreg BID     Hypertension  -SBP 130s-150s  -on Coreg BID and will continue         VTE Risk Mitigation (From admission, onward)         Ordered     Place sequential compression device  Until discontinued      01/25/20 1210     IP VTE LOW RISK PATIENT  Once      01/24/20 1919     Place LASHANDA hose  Until discontinued      01/24/20 1919     Place sequential compression device  Until discontinued      01/24/20 1919                TESRING Sena, ANP  Cardiology  [facility]

## 2020-01-28 NOTE — ASSESSMENT & PLAN NOTE
-HR 70s-80s overnight with bigeminy noted on telemetry; no RVR noted  -on Eliquis at home but will continue to hold given presence of pericardial drain; will resume oral AC once drain removed   -continue to monitor on telemetry; continue Coreg BID

## 2020-01-28 NOTE — PLAN OF CARE
Problem: Adult Inpatient Plan of Care  Goal: Plan of Care Review  Outcome: Ongoing, Progressing  Flowsheets (Taken 1/28/2020 0608)  Plan of Care Reviewed With: patient  Patient rested comfortably overnight with no complaints of pain. ERWIN drain with 35mL output.  mL with fluids started by MD Good.     Patient is awakens to verbal stimuli. Oriented to name, date of birth. Reoriented to time and location. Moves all extremities and follows commands. See flowsheets for details. All lines and drains are intact and fluids are infusing without difficulty. See MAR for gtts. Bed in lowest position.  at bedside. Siderails x3. Will continue to monitor

## 2020-01-28 NOTE — NURSING
Upon assessment of PIV, dressing appears to be wet. Leaking from site when flushed. IV removed at this time. RN attempt x 2 to start new PIV. RN Tuan to attempt. Patient has no IV access at this time

## 2020-01-28 NOTE — NURSING
Dr Arellano has evaluated pt and determines that there is insufficient fluid for therapeutic thoracentesis . He discussed this with Dr Good.

## 2020-01-28 NOTE — PLAN OF CARE
sent clinicals to [facility] via [facility].     01/28/20 5897   Post-Acute Status   Post-Acute Authorization Placement   Post-Acute Placement Status Additional Clinical Requested   Discharge Delays None known at this time

## 2020-01-29 PROBLEM — R53.81 PHYSICAL DEBILITY: Status: ACTIVE | Noted: 2020-01-29

## 2020-01-29 LAB
ANION GAP SERPL CALC-SCNC: 9 MMOL/L (ref 8–16)
BASOPHILS # BLD AUTO: 0.02 K/UL (ref 0–0.2)
BASOPHILS NFR BLD: 0.4 % (ref 0–1.9)
BUN SERPL-MCNC: 11 MG/DL (ref 8–23)
CALCIUM SERPL-MCNC: 8.4 MG/DL (ref 8.7–10.5)
CHLORIDE SERPL-SCNC: 105 MMOL/L (ref 95–110)
CMV DNA SPEC QL NAA+PROBE: NOT DETECTED
CO2 SERPL-SCNC: 28 MMOL/L (ref 23–29)
CREAT SERPL-MCNC: 0.7 MG/DL (ref 0.5–1.4)
DIFFERENTIAL METHOD: ABNORMAL
EOSINOPHIL # BLD AUTO: 0.1 K/UL (ref 0–0.5)
EOSINOPHIL NFR BLD: 1.6 % (ref 0–8)
ERYTHROCYTE [DISTWIDTH] IN BLOOD BY AUTOMATED COUNT: 14.9 % (ref 11.5–14.5)
EST. GFR  (AFRICAN AMERICAN): >60 ML/MIN/1.73 M^2
EST. GFR  (NON AFRICAN AMERICAN): >60 ML/MIN/1.73 M^2
GLUCOSE SERPL-MCNC: 136 MG/DL (ref 70–110)
HCT VFR BLD AUTO: 38.1 % (ref 37–48.5)
HGB BLD-MCNC: 11.8 G/DL (ref 12–16)
LYMPHOCYTES # BLD AUTO: 1.5 K/UL (ref 1–4.8)
LYMPHOCYTES NFR BLD: 26.5 % (ref 18–48)
MCH RBC QN AUTO: 28.7 PG (ref 27–31)
MCHC RBC AUTO-ENTMCNC: 31 G/DL (ref 32–36)
MCV RBC AUTO: 93 FL (ref 82–98)
MONOCYTES # BLD AUTO: 0.7 K/UL (ref 0.3–1)
MONOCYTES NFR BLD: 11.5 % (ref 4–15)
NEUTROPHILS # BLD AUTO: 3.4 K/UL (ref 1.8–7.7)
NEUTROPHILS NFR BLD: 60 % (ref 38–73)
PH FLD: 7.9 [PH]
PLATELET # BLD AUTO: 177 K/UL (ref 150–350)
PMV BLD AUTO: 12.6 FL (ref 9.2–12.9)
POCT GLUCOSE: 115 MG/DL (ref 70–110)
POCT GLUCOSE: 127 MG/DL (ref 70–110)
POCT GLUCOSE: 162 MG/DL (ref 70–110)
POCT GLUCOSE: 185 MG/DL (ref 70–110)
POTASSIUM SERPL-SCNC: 3.4 MMOL/L (ref 3.5–5.1)
RBC # BLD AUTO: 4.11 M/UL (ref 4–5.4)
SODIUM SERPL-SCNC: 142 MMOL/L (ref 136–145)
SPECIMEN SOURCE: NORMAL
SPECIMEN SOURCE: NORMAL
WBC # BLD AUTO: 5.67 K/UL (ref 3.9–12.7)

## 2020-01-29 PROCEDURE — 99233 SBSQ HOSP IP/OBS HIGH 50: CPT | Mod: ,,, | Performed by: INTERNAL MEDICINE

## 2020-01-29 PROCEDURE — 85025 COMPLETE CBC W/AUTO DIFF WBC: CPT

## 2020-01-29 PROCEDURE — 11000001 HC ACUTE MED/SURG PRIVATE ROOM

## 2020-01-29 PROCEDURE — 25000003 PHARM REV CODE 250: Performed by: INTERNAL MEDICINE

## 2020-01-29 PROCEDURE — 97165 OT EVAL LOW COMPLEX 30 MIN: CPT

## 2020-01-29 PROCEDURE — 36415 COLL VENOUS BLD VENIPUNCTURE: CPT

## 2020-01-29 PROCEDURE — 97161 PT EVAL LOW COMPLEX 20 MIN: CPT

## 2020-01-29 PROCEDURE — 80048 BASIC METABOLIC PNL TOTAL CA: CPT

## 2020-01-29 PROCEDURE — 99233 PR SUBSEQUENT HOSPITAL CARE,LEVL III: ICD-10-PCS | Mod: ,,, | Performed by: INTERNAL MEDICINE

## 2020-01-29 PROCEDURE — 63600175 PHARM REV CODE 636 W HCPCS: Performed by: NURSE PRACTITIONER

## 2020-01-29 PROCEDURE — 97530 THERAPEUTIC ACTIVITIES: CPT

## 2020-01-29 PROCEDURE — 25000003 PHARM REV CODE 250: Performed by: NURSE PRACTITIONER

## 2020-01-29 RX ORDER — HEPARIN SODIUM 5000 [USP'U]/ML
5000 INJECTION, SOLUTION INTRAVENOUS; SUBCUTANEOUS EVERY 8 HOURS
Status: DISCONTINUED | OUTPATIENT
Start: 2020-01-29 | End: 2020-01-30

## 2020-01-29 RX ORDER — POTASSIUM CHLORIDE 20 MEQ/1
40 TABLET, EXTENDED RELEASE ORAL ONCE
Status: COMPLETED | OUTPATIENT
Start: 2020-01-29 | End: 2020-01-29

## 2020-01-29 RX ADMIN — PANTOPRAZOLE SODIUM 40 MG: 40 TABLET, DELAYED RELEASE ORAL at 08:01

## 2020-01-29 RX ADMIN — POTASSIUM CHLORIDE 40 MEQ: 1500 TABLET, EXTENDED RELEASE ORAL at 04:01

## 2020-01-29 RX ADMIN — CARVEDILOL 6.25 MG: 6.25 TABLET, FILM COATED ORAL at 08:01

## 2020-01-29 RX ADMIN — GABAPENTIN 300 MG: 300 CAPSULE ORAL at 08:01

## 2020-01-29 RX ADMIN — DULOXETINE 30 MG: 30 CAPSULE, DELAYED RELEASE ORAL at 08:01

## 2020-01-29 RX ADMIN — HEPARIN SODIUM 5000 UNITS: 5000 INJECTION, SOLUTION INTRAVENOUS; SUBCUTANEOUS at 08:01

## 2020-01-29 RX ADMIN — HEPARIN SODIUM 5000 UNITS: 5000 INJECTION, SOLUTION INTRAVENOUS; SUBCUTANEOUS at 04:01

## 2020-01-29 RX ADMIN — GABAPENTIN 300 MG: 300 CAPSULE ORAL at 09:01

## 2020-01-29 RX ADMIN — HEPARIN SODIUM 5000 UNITS: 5000 INJECTION, SOLUTION INTRAVENOUS; SUBCUTANEOUS at 09:01

## 2020-01-29 RX ADMIN — GABAPENTIN 300 MG: 300 CAPSULE ORAL at 04:01

## 2020-01-29 RX ADMIN — CLOPIDOGREL BISULFATE 75 MG: 75 TABLET, FILM COATED ORAL at 08:01

## 2020-01-29 RX ADMIN — CARVEDILOL 6.25 MG: 6.25 TABLET, FILM COATED ORAL at 09:01

## 2020-01-29 NOTE — PLAN OF CARE
CM notified by primary team no additional procedures planned.  Pt is ready to return to [facility] for skilled nursing.  Call placed to facility, informed they will need new auth, will need pt out of ICU x's 24hrs prior to return, will require new auth.  CM contacted Callie to notify her of plan to return to facility.      PT/OT notified of need to be seen for return to skilled.  Will need new auth.    CM will work with [facility] and [facility] to have pt return to facility on tomorrow.  Primary team notified.            Karmen Navarro, RN    [phone]

## 2020-01-29 NOTE — PROGRESS NOTES
[facility]  Cardiology  Progress Note    Patient Name: Dori Whatley  MRN: [id number]  Admission Date: 1/24/2020  Hospital Length of Stay: 5 days  Code Status: Full Code   Attending Physician: Vadim Good MD   Primary Care Physician: Tru Sanchez MD  Expected Discharge Date:   Principal Problem:Pericardial effusion    Subjective:     Hospital Course:   1/25/2020 Transferred from [place] with pericardial effusion in need for pericardiocentesis. Echo at [facility] with large pericardial effusion with early tamponade physiology/RA collapse. BP stable and Eliquis held.   1/26/2020 Patient is doing well, Had episode of mild resp distress required NC o2. Now of oxygen and doing well.   1/27/2020 NPO for pericardiocentesis today  1/28/2020 Pericardiocentesis yesterday with approximately 920cc removed with fluid sent to lab for analysis. Admitted to ICU with pericardial drain in place. No output overnight per bedside RN with notation of 5-10cc this morning so far. Patient reports breathing improved and able to lie flat overnight. HR and BP stable overnight. Decrease of urine output noted yesterday with NS at 75cc/hr ordered with 750cc out overnight -2.8 liters since admission. Will plan for repeat limited echo today with pericardial drain removal once output decreased/non existent   1/29/2020 Repeat echo yesterday with resolution of pericardial effusion and removal of drain. IR consulted with minimal/trace pleural effusions therefore no repeat thoracentesis needed. Transferred to floor. HR and BP stable. PT and OT on board. Planning to discharge to rehab in [place] once approval obtained         Review of Systems   Constitution: Negative for chills, decreased appetite, diaphoresis, fever, malaise/fatigue, weight gain and weight loss.   Cardiovascular: Negative for chest pain, claudication, dyspnea on exertion, irregular heartbeat, leg swelling, near-syncope, orthopnea, palpitations and  paroxysmal nocturnal dyspnea.   Respiratory: Negative for cough, shortness of breath, snoring, sputum production and wheezing.    Endocrine: Negative for cold intolerance, heat intolerance, polydipsia, polyphagia and polyuria.   Skin: Negative for color change, dry skin, itching, nail changes and poor wound healing.   Musculoskeletal: Negative for back pain, gout, joint pain and joint swelling.   Gastrointestinal: Negative for bloating, abdominal pain, constipation, diarrhea, hematemesis, hematochezia, melena, nausea and vomiting.   Genitourinary: Negative for dysuria, hematuria and nocturia.   Neurological: Negative for dizziness, headaches, light-headedness, numbness, paresthesias and weakness.   Psychiatric/Behavioral: Negative for altered mental status, depression and memory loss.     Objective:     Vital Signs (Most Recent):  Temp: 96.3 °F (35.7 °C) (01/29/20 0752)  Pulse: 81 (01/29/20 0753)  Resp: 18 (01/29/20 0752)  BP: 133/61 (01/29/20 0752)  SpO2: 95 % (01/29/20 0752) Vital Signs (24h Range):  Temp:  [96.3 °F (35.7 °C)-98.9 °F (37.2 °C)] 96.3 °F (35.7 °C)  Pulse:  [60-86] 81  Resp:  [16-28] 18  SpO2:  [94 %-97 %] 95 %  BP: (101-163)/(53-72) 133/61     Weight: 66.5 kg (146 lb 9.7 oz)  Body mass index is 22.96 kg/m².     SpO2: 95 %  O2 Device (Oxygen Therapy): room air      Intake/Output Summary (Last 24 hours) at 1/29/2020 1001  Last data filed at 1/29/2020 0600  Gross per 24 hour   Intake 855 ml   Output 1715 ml   Net -860 ml       Lines/Drains/Airways     Drain            Female External Urinary Catheter 01/24/20 2123 4 days          Peripheral Intravenous Line                 Peripheral IV - Single Lumen 01/27/20 2140 20 G Anterior;Left Forearm 1 day                Physical Exam   Constitutional: She is oriented to person, place, and time. She appears well-developed and well-nourished. No distress.   Neck: No JVD present.   Cardiovascular: Normal rate and regular rhythm. Exam reveals no gallop.   No  murmur heard.  Pulmonary/Chest: Effort normal and breath sounds normal. No respiratory distress. She has no wheezes.   Abdominal: Soft. Bowel sounds are normal. She exhibits no distension. There is no tenderness.   Musculoskeletal: She exhibits edema.   Neurological: She is alert and oriented to person, place, and time.   Skin: Skin is warm and dry.       Significant Labs:     Recent Labs   Lab 01/29/20  0605   WBC 5.67   RBC 4.11   HGB 11.8*   HCT 38.1      MCV 93   MCH 28.7   MCHC 31.0*     Recent Labs   Lab 01/29/20  0605      K 3.4*      CO2 28   BUN 11   CREATININE 0.7       Significant Imaging: Echocardiogram:   Transthoracic echo (TTE) complete (Cupid Only):   Results for orders placed or performed during the hospital encounter of 01/24/20   Echo Color Flow Doppler? Yes   Result Value Ref Range    LVIDD 4.80 3.5 - 6.0 cm    IVS 1.50 0.6 - 1.1 cm    PW 1.00 0.6 - 1.1 cm    LVIDS 3.60 2.1 - 4.0 cm    FS 25 28 - 44 %    LV mass 232.25 g    LA size 2.10 cm    Left Ventricle Relative Wall Thickness 0.42 cm    LVOT diameter 2.00 cm    LVOT area 3.1 cm2    LV Mass Index 131 g/m2    Narrative    · Low normal left ventricular systolic function. The estimated ejection   fraction is 50%.  · Moderate concentric left ventricular hypertrophy.  · There is a left pleural effusion.  · No pericardial effusion        Assessment and Plan:     Brief HPI: Seen on AM NP rounds with  at the bedside. Denies any complaints and eager to start working with therapy again. Discussed POC as detailed below-verbalized understanding and agrees with POC     * Pericardial effusion  -echocardiogram with large pericardial effusion with early tamponade physiology/ RA collapse; transferred to [facility] for further treatment/management  -pericardiocentesis  with approximately 920cc with drain removed yesterday; fluid analysis in process   -HR and BP stable; SOB improved  per patient          Physical debility  -admitted  previously at OSH with pneumonia with physical debility noted and transferred to rehab  -SOB with notation of pleural and pericardial effusion noted on CT with transfer back to [facility] and eventually to [facility] for pericardiocentesis  -eager to resume PT and get back to rehab  -PT and OT consulted  -anticipate discharge back to rehab    Postsurgical cardiac pacemaker in situ  -PPM for  CHB  -appropriate pacemaker function noted on telemetry     Paroxysmal atrial fibrillation  -HR 60s-70s overnight; no arrhythmias noted on telemetry  -on Eliquis BID at home-held due to need for pericardiocentesis; on SQ Heparin currently for DVT prophylaxis; will continue to hold Eliquis for now and anticipate resumption closer to discharge   -continue to monitor on telemetry; continue Coreg BID     Hypertension  -SBP 130s-140s  -on Coreg BID and will continue         VTE Risk Mitigation (From admission, onward)         Ordered     heparin (porcine) injection 5,000 Units  Every 8 hours      01/29/20 0656     Place sequential compression device  Until discontinued      01/25/20 1210     IP VTE LOW RISK PATIENT  Once      01/24/20 1919     Place LASHANDA hose  Until discontinued      01/24/20 1919                TSERING Sena, ANP  Cardiology  [facility]

## 2020-01-29 NOTE — PLAN OF CARE
[facility] notified that Callie will be out tomorrow, request that aut request be sent  today to begin processing.       01/29/20 1406   Post-Acute Status   Post-Acute Authorization Placement   Post-Acute Placement Status   (PT/OT eval sent to  [facility] to complete auth request.)       Karmen Navarro RN    [phone]

## 2020-01-29 NOTE — NURSING TRANSFER
Nursing Transfer Note      1/28/2020     Transfer To: [id number]    Transfer via bed    Transfer with cardiac monitoring    Transported by JAYNA Barkley     Medicines sent: Yes    Chart sent with patient: Yes    Notified: spouse    Upon arrival to floor: RN at bedside. Cardiac monitor applied, patient oriented to room, call bell in reach and bed in lowest position.

## 2020-01-29 NOTE — ASSESSMENT & PLAN NOTE
-admitted previously at OSH with pneumonia with physical debility noted and transferred to rehab  -SOB with notation of pleural and pericardial effusion noted on CT with transfer back to [facility] and eventually to [facility] for pericardiocentesis  -eager to resume PT and get back to rehab  -PT and OT consulted  -anticipate discharge back to rehab

## 2020-01-29 NOTE — PLAN OF CARE
Problem: Occupational Therapy Goal  Goal: Occupational Therapy Goal  Description  Goals to be met by: 2/29/20     Patient will increase functional independence with ADLs by performing:    LE Dressing with Supervision.  Grooming while standing with Supervision.  Toileting from toilet with Supervision for hygiene and clothing management.   Supine to sit with Supervision.  Step transfer with Supervision  Toilet transfer to toilet with Supervision.  Upper extremity exercise program x10 reps per handout, with independence.     Outcome: Ongoing, Progressing     Pt would benefit from cont OT services in order to maximize functional independence. Recommending return to SNF at d/c

## 2020-01-29 NOTE — CONSULTS
Interventional Radiology Consult/Pre-Procedure Note      Chief Complaint/Reason for Consult: Pleural effusion    History of Present Illness:  Dori Whatley is a 79 y.o. female with the PMH listed below who presents with a left pleural effusion on recent echo. Patient recently underwent large vol pericardiocentesis. IR consulted for thoracentesis.    Admission H&P reviewed.    Past Medical History:   Diagnosis Date    Arthritis     Cancer     breast    Chronic back pain     Diabetes mellitus     Hypertension     Stroke     minor one     Past Surgical History:   Procedure Laterality Date    BACK SURGERY      HYSTERECTOMY      MASTECTOMY      TONSILLECTOMY         Allergies:   Review of patient's allergies indicates:   Allergen Reactions    Nitrofurantoin monohyd/m-cryst Rash and Shortness Of Breath       Scheduled Meds:    carvedilol  6.25 mg Oral BID    clopidogrel  75 mg Oral Daily    DULoxetine  30 mg Oral Daily    gabapentin  300 mg Oral TID    pantoprazole  40 mg Oral Daily     Continuous Infusions:   PRN Meds:acetaminophen, aluminum-magnesium hydroxide-simethicone, Dextrose 10% Bolus, Dextrose 10% Bolus, glucagon (human recombinant), glucose, glucose, HYDROcodone-acetaminophen, insulin aspart U-100, labetalol, melatonin, ondansetron, ondansetron, polyethylene glycol, simethicone, sodium chloride 0.9%    Anticoagulation/Antiplatelet Meds: Plavix    Review of Systems:   As documented in admission H&P.    Physical Exam:  Temp: 98.4 °F (36.9 °C) (01/28/20 2055)  Pulse: 61 (01/28/20 2055)  Resp: 18 (01/28/20 2055)  BP: (!) 148/72 (01/28/20 2055)  SpO2: 97 % (01/28/20 2055)     General: NAD  HEENT: Normocephalic, sclera anicteric, oropharynx clear  Heart: RRR  Lungs: Symmetric excursions, breathing unlabored  Abd: NTND, soft  Extremities: CHOI  Neuro: Gross nonfocal    Labs:  No results for input(s): INR in the last 168 hours.    Invalid input(s):  PT,  PTT    Recent Labs   Lab 01/28/20  9648    WBC 8.33   HGB 11.5*   HCT 36.7*   MCV 90         Recent Labs   Lab 01/25/20  0901  01/28/20  0317   *   < > 146*      < > 138   K 4.3   < > 3.8      < > 103   CO2 28   < > 27   BUN 11   < > 12   CREATININE 0.7   < > 0.6   CALCIUM 8.2*   < > 8.3*   ALT 25  --   --    AST 17  --   --    ALBUMIN 2.4*  --   --    BILITOT 1.0  --   --     < > = values in this interval not displayed.     Imaging:  None available.    Assessment/Plan:   Pleural effusion. Will scan at bedside and will perform thoracentesis if there is sufficient fluid.    Sedation: None    Risks (including, but not limited to, pain, bleeding, infection, damage to nearby structures, failure to , and the need for additional procedures), benefits, and alternatives were discussed with the patient's . All questions were answered to the best of my abilities. The patient's  would like us to proceed with thoracentesis. Written informed consent was obtained.      Mauro EscobarBanner [facility]wood [facility] Center  Pager [phone]

## 2020-01-29 NOTE — PLAN OF CARE
Reported no pain.  Ruslan at bed side. 2100 accu ck 175, no coverage needed.  PCT placed pure wick.      Tele: paced, HR 70,  No alarms.     Bed in lowest position, wheels locked, non skid socks, ID band worn, personal items and call bell with in reach, bed alarm set.

## 2020-01-29 NOTE — PT/OT/SLP EVAL
"Occupational Therapy   Evaluation    Name: Dori Whatley  MRN: [id number]  Admitting Diagnosis:  Pericardial effusion 2 Days Post-Op    Recommendations:     Discharge Recommendations: nursing facility, skilled  Discharge Equipment Recommendations:  (TBD)  Barriers to discharge:  None(if return to SNF)    Assessment:     Dori Whatley is a 79 y.o. female with a medical diagnosis of Pericardial effusion.  She presents with deconditioning. Performance deficits affecting function: weakness, impaired balance, impaired self care skills, impaired functional mobilty, impaired endurance, gait instability, decreased upper extremity function, decreased safety awareness, decreased ROM, impaired skin, impaired cardiopulmonary response to activity.      Pt would benefit from cont OT services in order to maximize functional independence. Recommending return to SNF at d/c     Rehab Prognosis: Good; patient would benefit from acute skilled OT services to address these deficits and reach maximum level of function.       Plan:     Patient to be seen 5 x/week to address the above listed problems via self-care/home management, therapeutic activities, therapeutic exercises  · Plan of Care Expires: 02/29/20  · Plan of Care Reviewed with: patient    Subjective     Chief Complaint: pt with no complaints. States, "Oh! Y'all are going to work me out!"   Patient/Family Comments/goals: return to PLOF     Occupational Profile:  Living Environment: per chart, pt admitted from SNF; states prior she was living with  in [facility], threshold to enter, t/s combo   Previous level of function: reports mod I at home; states assist required at SNF currently   Equipment Used at Home:  walker, rolling, shower chair, bedside commode  Assistance upon Discharge: from SNF staff     Pain/Comfort:  · Pain Rating 1: 0/10    Patients cultural, spiritual, Tenriism conflicts given the current situation:      Objective:     Communicated with: mary jo prior " to session.  General Precautions: Standard, fall   Orthopedic Precautions:N/A   Braces: N/A     Occupational Performance:    Bed Mobility:    · Patient completed Scooting/Bridging with stand by assistance  · Patient completed Supine to Sit with stand by assistance    Functional Mobility/Transfers:  · Patient completed Sit <> Stand Transfer with contact guard assistance  with  rolling walker   · Patient completed Bed <> Chair Transfer using Step Transfer technique with contact guard assistance and minimum assistance with rolling walker  · Functional Mobility: CGA/Min A with RW     Activities of Daily Living:  · Toileting: total assistance emiliano     Cognitive/Visual Perceptual:  Cognitive/Psychosocial Skills:     -       Oriented to: Person, Place, Time and Situation   -       Follows Commands/attention:Follows multistep  commands  -       Communication: clear/fluent  -       Memory: Impaired LTM  -       Safety awareness/insight to disability: impaired   -       Mood/Affect/Coping skills/emotional control: Appropriate to situation    Physical Exam:  Balance:    -       WFL sitting balance; CGA/Min A standing  Postural examination/scapula alignment:    -       Rounded shoulders  -       Forward head  -       Kyphosis  Skin integrity: wound anterior chest from drain; bandaged   Dominant hand:    -       Right   Upper Extremity Range of Motion:   RUE significantly limited at shoulder, WFL distally; LUE WFL for pt's needs   Upper Extremity Strength:  Not formally assessed; grossly 4-/5 based on function    Strength:  Good   Fine Motor Coordination:    -       Intact    AMPAC 6 Click ADL:  AMPAC Total Score: 17    Treatment & Education:  Pt agreeable to treatment and enthusiastic about pariticipation   SBA/CGA bed mobility with use of rails   Functional stand from EOB with RW CGA/Min A   Functional mobility around bed with CGA/Min A with RW   Pt requiring seated rest break following ambulation; demonstrating BLE  ROM/therex   Stood from b/s chair Min A and performed unilateral UE bicep curls 1 x 10 in stance  Reporting fatigue and request to rest   Education:    Patient left up in chair with all lines intact, call button in reach, chair alarm on and nsg notified    GOALS:   Multidisciplinary Problems     Occupational Therapy Goals        Problem: Occupational Therapy Goal    Goal Priority Disciplines Outcome Interventions   Occupational Therapy Goal     OT, PT/OT Ongoing, Progressing    Description:  Goals to be met by: 2/29/20     Patient will increase functional independence with ADLs by performing:    LE Dressing with Supervision.  Grooming while standing with Supervision.  Toileting from toilet with Supervision for hygiene and clothing management.   Supine to sit with Supervision.  Step transfer with Supervision  Toilet transfer to toilet with Supervision.  Upper extremity exercise program x10 reps per handout, with independence.                      History:     Past Medical History:   Diagnosis Date    Arthritis     Cancer     breast    Chronic back pain     Diabetes mellitus     Hypertension     Stroke     minor one       Past Surgical History:   Procedure Laterality Date    BACK SURGERY      HYSTERECTOMY      MASTECTOMY      TONSILLECTOMY         Time Tracking:     OT Date of Treatment: 01/29/20  OT Start Time: 1037  OT Stop Time: 1100  OT Total Time (min): 23 min    Billable Minutes:Evaluation 10  Therapeutic Activity 13    Keena Tomlinson OT  1/29/2020

## 2020-01-29 NOTE — PT/OT/SLP EVAL
Physical Therapy Evaluation    Patient Name:  Dori Whatley   MRN:  [id number]    Recommendations:     Discharge Recommendations:  nursing facility, skilled   Discharge Equipment Recommendations: none   Barriers to discharge: None    Assessment:     Dori Whatley is a 79 y.o. female admitted with a medical diagnosis of Pericardial effusion.  She presents with the following impairments/functional limitations:  weakness, gait instability, decreased lower extremity function, impaired balance, impaired endurance, impaired functional mobilty, impaired self care skills, decreased safety awareness, decreased ROM, decreased upper extremity function, impaired cardiopulmonary response to activity . Patient appears oriented slightly confused but reoriented. Patient able to ambulate with RW ~ 10 ft to chair in room with CG to min assist. Recommend SNF after discharge from hospital..    Rehab Prognosis: Good; patient would benefit from acute skilled PT services to address these deficits and reach maximum level of function.    Recent Surgery: Procedure(s) (LRB):  Pericardiocentesis (N/A) 2 Days Post-Op    Plan:     During this hospitalization, patient to be seen   to address the identified rehab impairments via gait training, therapeutic activities, therapeutic exercises and progress toward the following goals:    · Plan of Care Expires:  02/29/20    Subjective     Chief Complaint: weakness  Patient/Family Comments/goals: go home  Pain/Comfort:  · Pain Rating 1: 0/10  · Pain Rating Post-Intervention 1: 0/10    Patients cultural, spiritual, Rastafari conflicts given the current situation:      Living Environment:  Lives with spouse [facility] no steps to enter t/s combo  Prior to admission, patients level of function needed assistance.  Equipment used at home: shower chair, walker, rolling, bedside commode.  DME owned (not currently used): none.  Upon discharge, patient will have assistance from family.    Objective:      Communicated with primary nurse prior to session.  Patient found HOB elevated with telemetry, Jodick  upon PT entry to room.    General Precautions: Standard, fall   Orthopedic Precautions:N/A   Braces: N/A     Exams:  · RLE ROM: WFL  · RLE Strength: +3/5 to 4/5  · LLE ROM: WFL  · LLE Strength: +3/5 to 4/5    Functional Mobility:  · Bed Mobility:     · Supine to Sit: contact guard assistance and minimum assistance  · Transfers:     · Sit to Stand:  contact guard assistance and minimum assistance with rolling walker  · Gait: min to CG with RW  · Balance: poor + to fair with RW      Therapeutic Activities and Exercises:   Reviewed AROM BLE seated     AM-PAC 6 CLICK MOBILITY  Total Score:17     Patient left up in chair with all lines intact, call button in reach, chair alarm on and O T present.    GOALS:   Multidisciplinary Problems     Physical Therapy Goals        Problem: Physical Therapy Goal    Goal Priority Disciplines Outcome Goal Variances Interventions   Physical Therapy Goal     PT, PT/OT Ongoing, Progressing     Description:  Goals to be met by: 2020     Patient will increase functional independence with mobility by performin. Supine to sit with Modified Finney  2. Sit to stand transfer with Modified Finney  3. Bed to chair transfer with Modified Finney using Rolling Walker  4. Gait  x 150 feet with Modified Finney using Rolling Walker.                       History:     Past Medical History:   Diagnosis Date    Arthritis     Cancer     breast    Chronic back pain     Diabetes mellitus     Hypertension     Stroke     minor one       Past Surgical History:   Procedure Laterality Date    BACK SURGERY      HYSTERECTOMY      MASTECTOMY      TONSILLECTOMY         Time Tracking:     PT Received On: 20  PT Start Time: 1035     PT Stop Time: 1050  PT Total Time (min): 15 min     Billable Minutes: Evaluation 15      Mario Hernandez, PT  2020

## 2020-01-29 NOTE — PLAN OF CARE
Call from Callie at [facility] stating she is waiting on auth request from [facility], cautions that request will need to be sent to day to ensure processing and auth by tomorrow.  Callie also states she is out tomorrow and will have someone covering which can delay auth.    Informed Callie Cm awaiting PT/OT note to send to facility for request to happen.  CM did speak with Audelia with therapy to assist with expediting.  Will await therapy note.    Karmen Navarro RN    [phone]

## 2020-01-29 NOTE — PROCEDURES
Pt scanned at bedside. Trace pleural effusions bilaterally, insufficient for therapeutic thoracentesis. No thoracentesis was performed.    Dr. Good updated.      Mauro Arellano MD  [facility]  Pager [phone]

## 2020-01-29 NOTE — ASSESSMENT & PLAN NOTE
-echocardiogram with large pericardial effusion with early tamponade physiology/ RA collapse; transferred to [facility] for further treatment/management  -pericardiocentesis  with approximately 920cc with drain removed yesterday; fluid analysis in process   -HR and BP stable; SOB improved  per patient

## 2020-01-29 NOTE — PLAN OF CARE
Problem: Physical Therapy Goal  Goal: Physical Therapy Goal  Description  Goals to be met by: 2020     Patient will increase functional independence with mobility by performin. Supine to sit with Modified Tate  2. Sit to stand transfer with Modified Tate  3. Bed to chair transfer with Modified Tate using Rolling Walker  4. Gait  x 150 feet with Modified Tate using Rolling Walker.      Outcome: Ongoing, Progressing   Recommend return to SNF

## 2020-01-29 NOTE — SUBJECTIVE & OBJECTIVE
Review of Systems   Constitution: Negative for chills, decreased appetite, diaphoresis, fever, malaise/fatigue, weight gain and weight loss.   Cardiovascular: Negative for chest pain, claudication, dyspnea on exertion, irregular heartbeat, leg swelling, near-syncope, orthopnea, palpitations and paroxysmal nocturnal dyspnea.   Respiratory: Negative for cough, shortness of breath, snoring, sputum production and wheezing.    Endocrine: Negative for cold intolerance, heat intolerance, polydipsia, polyphagia and polyuria.   Skin: Negative for color change, dry skin, itching, nail changes and poor wound healing.   Musculoskeletal: Negative for back pain, gout, joint pain and joint swelling.   Gastrointestinal: Negative for bloating, abdominal pain, constipation, diarrhea, hematemesis, hematochezia, melena, nausea and vomiting.   Genitourinary: Negative for dysuria, hematuria and nocturia.   Neurological: Negative for dizziness, headaches, light-headedness, numbness, paresthesias and weakness.   Psychiatric/Behavioral: Negative for altered mental status, depression and memory loss.     Objective:     Vital Signs (Most Recent):  Temp: 96.3 °F (35.7 °C) (01/29/20 0752)  Pulse: 81 (01/29/20 0753)  Resp: 18 (01/29/20 0752)  BP: 133/61 (01/29/20 0752)  SpO2: 95 % (01/29/20 0752) Vital Signs (24h Range):  Temp:  [96.3 °F (35.7 °C)-98.9 °F (37.2 °C)] 96.3 °F (35.7 °C)  Pulse:  [60-86] 81  Resp:  [16-28] 18  SpO2:  [94 %-97 %] 95 %  BP: (101-163)/(53-72) 133/61     Weight: 66.5 kg (146 lb 9.7 oz)  Body mass index is 22.96 kg/m².     SpO2: 95 %  O2 Device (Oxygen Therapy): room air      Intake/Output Summary (Last 24 hours) at 1/29/2020 1001  Last data filed at 1/29/2020 0600  Gross per 24 hour   Intake 855 ml   Output 1715 ml   Net -860 ml       Lines/Drains/Airways     Drain            Female External Urinary Catheter 01/24/20 2123 4 days          Peripheral Intravenous Line                 Peripheral IV - Single Lumen  01/27/20 2140 20 G Anterior;Left Forearm 1 day                Physical Exam   Constitutional: She is oriented to person, place, and time. She appears well-developed and well-nourished. No distress.   Neck: No JVD present.   Cardiovascular: Normal rate and regular rhythm. Exam reveals no gallop.   No murmur heard.  Pulmonary/Chest: Effort normal and breath sounds normal. No respiratory distress. She has no wheezes.   Abdominal: Soft. Bowel sounds are normal. She exhibits no distension. There is no tenderness.   Musculoskeletal: She exhibits edema.   Neurological: She is alert and oriented to person, place, and time.   Skin: Skin is warm and dry.       Significant Labs:     Recent Labs   Lab 01/29/20  0605   WBC 5.67   RBC 4.11   HGB 11.8*   HCT 38.1      MCV 93   MCH 28.7   MCHC 31.0*     Recent Labs   Lab 01/29/20  0605      K 3.4*      CO2 28   BUN 11   CREATININE 0.7       Significant Imaging: Echocardiogram:   Transthoracic echo (TTE) complete (Cupid Only):   Results for orders placed or performed during the hospital encounter of 01/24/20   Echo Color Flow Doppler? Yes   Result Value Ref Range    LVIDD 4.80 3.5 - 6.0 cm    IVS 1.50 0.6 - 1.1 cm    PW 1.00 0.6 - 1.1 cm    LVIDS 3.60 2.1 - 4.0 cm    FS 25 28 - 44 %    LV mass 232.25 g    LA size 2.10 cm    Left Ventricle Relative Wall Thickness 0.42 cm    LVOT diameter 2.00 cm    LVOT area 3.1 cm2    LV Mass Index 131 g/m2    Narrative    · Low normal left ventricular systolic function. The estimated ejection   fraction is 50%.  · Moderate concentric left ventricular hypertrophy.  · There is a left pleural effusion.  · No pericardial effusion

## 2020-01-30 LAB
ANION GAP SERPL CALC-SCNC: 6 MMOL/L (ref 8–16)
BASOPHILS # BLD AUTO: 0.01 K/UL (ref 0–0.2)
BASOPHILS NFR BLD: 0.2 % (ref 0–1.9)
BUN SERPL-MCNC: 7 MG/DL (ref 8–23)
CALCIUM SERPL-MCNC: 8.4 MG/DL (ref 8.7–10.5)
CHLORIDE SERPL-SCNC: 107 MMOL/L (ref 95–110)
CO2 SERPL-SCNC: 29 MMOL/L (ref 23–29)
CREAT SERPL-MCNC: 0.6 MG/DL (ref 0.5–1.4)
DIFFERENTIAL METHOD: ABNORMAL
EOSINOPHIL # BLD AUTO: 0.1 K/UL (ref 0–0.5)
EOSINOPHIL NFR BLD: 1.2 % (ref 0–8)
ERYTHROCYTE [DISTWIDTH] IN BLOOD BY AUTOMATED COUNT: 14.6 % (ref 11.5–14.5)
EST. GFR  (AFRICAN AMERICAN): >60 ML/MIN/1.73 M^2
EST. GFR  (NON AFRICAN AMERICAN): >60 ML/MIN/1.73 M^2
FINAL PATHOLOGIC DIAGNOSIS: NORMAL
GLUCOSE SERPL-MCNC: 129 MG/DL (ref 70–110)
HCT VFR BLD AUTO: 35 % (ref 37–48.5)
HGB BLD-MCNC: 11 G/DL (ref 12–16)
LYMPHOCYTES # BLD AUTO: 1.2 K/UL (ref 1–4.8)
LYMPHOCYTES NFR BLD: 28.2 % (ref 18–48)
MCH RBC QN AUTO: 28.3 PG (ref 27–31)
MCHC RBC AUTO-ENTMCNC: 31.4 G/DL (ref 32–36)
MCV RBC AUTO: 90 FL (ref 82–98)
MICROSCOPIC EXAM: NORMAL
MONOCYTES # BLD AUTO: 0.5 K/UL (ref 0.3–1)
MONOCYTES NFR BLD: 11.3 % (ref 4–15)
NEUTROPHILS # BLD AUTO: 2.5 K/UL (ref 1.8–7.7)
NEUTROPHILS NFR BLD: 59.1 % (ref 38–73)
PLATELET # BLD AUTO: 185 K/UL (ref 150–350)
PMV BLD AUTO: 11.6 FL (ref 9.2–12.9)
POCT GLUCOSE: 124 MG/DL (ref 70–110)
POCT GLUCOSE: 134 MG/DL (ref 70–110)
POCT GLUCOSE: 141 MG/DL (ref 70–110)
POCT GLUCOSE: 162 MG/DL (ref 70–110)
POTASSIUM SERPL-SCNC: 3.7 MMOL/L (ref 3.5–5.1)
RBC # BLD AUTO: 3.89 M/UL (ref 4–5.4)
SODIUM SERPL-SCNC: 142 MMOL/L (ref 136–145)
WBC # BLD AUTO: 4.26 K/UL (ref 3.9–12.7)

## 2020-01-30 PROCEDURE — 80048 BASIC METABOLIC PNL TOTAL CA: CPT

## 2020-01-30 PROCEDURE — 94761 N-INVAS EAR/PLS OXIMETRY MLT: CPT

## 2020-01-30 PROCEDURE — 97110 THERAPEUTIC EXERCISES: CPT

## 2020-01-30 PROCEDURE — 97110 THERAPEUTIC EXERCISES: CPT | Mod: CQ

## 2020-01-30 PROCEDURE — 11000001 HC ACUTE MED/SURG PRIVATE ROOM

## 2020-01-30 PROCEDURE — 63600175 PHARM REV CODE 636 W HCPCS: Performed by: NURSE PRACTITIONER

## 2020-01-30 PROCEDURE — 85025 COMPLETE CBC W/AUTO DIFF WBC: CPT

## 2020-01-30 PROCEDURE — 99233 SBSQ HOSP IP/OBS HIGH 50: CPT | Mod: ,,, | Performed by: INTERNAL MEDICINE

## 2020-01-30 PROCEDURE — 25000003 PHARM REV CODE 250: Performed by: NURSE PRACTITIONER

## 2020-01-30 PROCEDURE — 97116 GAIT TRAINING THERAPY: CPT | Mod: CQ

## 2020-01-30 PROCEDURE — 97530 THERAPEUTIC ACTIVITIES: CPT | Mod: CQ

## 2020-01-30 PROCEDURE — 36415 COLL VENOUS BLD VENIPUNCTURE: CPT

## 2020-01-30 PROCEDURE — 97530 THERAPEUTIC ACTIVITIES: CPT

## 2020-01-30 PROCEDURE — 99233 PR SUBSEQUENT HOSPITAL CARE,LEVL III: ICD-10-PCS | Mod: ,,, | Performed by: INTERNAL MEDICINE

## 2020-01-30 RX ORDER — ACETAMINOPHEN 325 MG/1
650 TABLET ORAL EVERY 4 HOURS PRN
Status: DISCONTINUED | OUTPATIENT
Start: 2020-01-30 | End: 2020-02-03 | Stop reason: HOSPADM

## 2020-01-30 RX ORDER — LOSARTAN POTASSIUM 25 MG/1
25 TABLET ORAL DAILY
Status: DISCONTINUED | OUTPATIENT
Start: 2020-01-30 | End: 2020-02-03 | Stop reason: HOSPADM

## 2020-01-30 RX ADMIN — CARVEDILOL 6.25 MG: 6.25 TABLET, FILM COATED ORAL at 09:01

## 2020-01-30 RX ADMIN — GABAPENTIN 300 MG: 300 CAPSULE ORAL at 09:01

## 2020-01-30 RX ADMIN — APIXABAN 5 MG: 5 TABLET, FILM COATED ORAL at 04:01

## 2020-01-30 RX ADMIN — DULOXETINE 30 MG: 30 CAPSULE, DELAYED RELEASE ORAL at 09:01

## 2020-01-30 RX ADMIN — GABAPENTIN 300 MG: 300 CAPSULE ORAL at 02:01

## 2020-01-30 RX ADMIN — HEPARIN SODIUM 5000 UNITS: 5000 INJECTION, SOLUTION INTRAVENOUS; SUBCUTANEOUS at 05:01

## 2020-01-30 RX ADMIN — LOSARTAN POTASSIUM 25 MG: 25 TABLET, FILM COATED ORAL at 10:01

## 2020-01-30 RX ADMIN — CLOPIDOGREL BISULFATE 75 MG: 75 TABLET, FILM COATED ORAL at 09:01

## 2020-01-30 RX ADMIN — PANTOPRAZOLE SODIUM 40 MG: 40 TABLET, DELAYED RELEASE ORAL at 09:01

## 2020-01-30 NOTE — ASSESSMENT & PLAN NOTE
-SBP 140s-160s overnight  -on Coreg and Losartan HCTZ at home  -will continue Coreg and resume Losartan at 25mg po daily today; up titrate as needed for BP control

## 2020-01-30 NOTE — PROGRESS NOTES
[facility]  Cardiology  Progress Note    Patient Name: Dori Whatley  MRN: [id number]  Admission Date: 1/24/2020  Hospital Length of Stay: 6 days  Code Status: Full Code   Attending Physician: Vadim Good MD   Primary Care Physician: Tru Sanchez MD  Expected Discharge Date:   Principal Problem:Pericardial effusion    Subjective:     Hospital Course:   1/25/2020 Transferred from [place] with pericardial effusion in need for pericardiocentesis. Echo at [facility] with large pericardial effusion with early tamponade physiology/RA collapse. BP stable and Eliquis held.   1/26/2020 Patient is doing well, Had episode of mild resp distress required NC o2. Now of oxygen and doing well.   1/27/2020 NPO for pericardiocentesis today  1/28/2020 Pericardiocentesis yesterday with approximately 920cc removed with fluid sent to lab for analysis. Admitted to ICU with pericardial drain in place. No output overnight per bedside RN with notation of 5-10cc this morning so far. Patient reports breathing improved and able to lie flat overnight. HR and BP stable overnight. Decrease of urine output noted yesterday with NS at 75cc/hr ordered with 750cc out overnight -2.8 liters since admission. Will plan for repeat limited echo today with pericardial drain removal once output decreased/non existent   1/29/2020 Repeat echo yesterday with resolution of pericardial effusion and removal of drain. IR consulted with minimal/trace pleural effusions therefore no repeat thoracentesis needed. Transferred to floor. HR and BP stable. PT and OT on board. Planning to discharge to rehab in [place] once approval obtained   1/30/2020 HR stable overnight. BP 140s-160s/60s overnight. CBC and BMP stable. Complaints of SOB this morning relieved with sitting up and drinking coffee. Culture with staphylococcus hominin on prelim report-will consult ID         Review of Systems   Constitution: Negative for chills, decreased  appetite, diaphoresis, fever, malaise/fatigue, weight gain and weight loss.   Cardiovascular: Negative for chest pain, claudication, dyspnea on exertion, irregular heartbeat, leg swelling, near-syncope, orthopnea, palpitations and paroxysmal nocturnal dyspnea.   Respiratory: Positive for shortness of breath. Negative for cough, snoring, sputum production and wheezing.    Endocrine: Negative for cold intolerance, heat intolerance, polydipsia, polyphagia and polyuria.   Skin: Negative for color change, dry skin, itching, nail changes and poor wound healing.   Musculoskeletal: Negative for back pain, gout, joint pain and joint swelling.   Gastrointestinal: Negative for bloating, abdominal pain, constipation, diarrhea, hematemesis, hematochezia, melena, nausea and vomiting.   Genitourinary: Negative for dysuria, hematuria and nocturia.   Neurological: Negative for dizziness, headaches, light-headedness, numbness, paresthesias and weakness.   Psychiatric/Behavioral: Negative for altered mental status, depression and memory loss.     Objective:     Vital Signs (Most Recent):  Temp: 98.7 °F (37.1 °C) (01/30/20 0724)  Pulse: 84 (01/30/20 0817)  Resp: (!) 22 (01/30/20 0724)  BP: (!) 171/72 (01/30/20 0724)  SpO2: 95 % (01/30/20 0724) Vital Signs (24h Range):  Temp:  [97.6 °F (36.4 °C)-98.7 °F (37.1 °C)] 98.7 °F (37.1 °C)  Pulse:  [60-84] 84  Resp:  [17-22] 22  SpO2:  [94 %-96 %] 95 %  BP: (138-182)/(65-84) 171/72     Weight: 66.5 kg (146 lb 9.7 oz)  Body mass index is 22.96 kg/m².     SpO2: 95 %  O2 Device (Oxygen Therapy): room air      Intake/Output Summary (Last 24 hours) at 1/30/2020 1011  Last data filed at 1/30/2020 0900  Gross per 24 hour   Intake 957 ml   Output 2000 ml   Net -1043 ml       Lines/Drains/Airways     Drain            Female External Urinary Catheter 01/24/20 2123 5 days          Peripheral Intravenous Line                 Peripheral IV - Single Lumen 01/27/20 2140 20 G Anterior;Left Forearm 2 days                 Physical Exam   Constitutional: She is oriented to person, place, and time. She appears well-developed and well-nourished. No distress.   Cardiovascular: Normal rate and regular rhythm. Exam reveals no gallop.   No murmur heard.  Pulmonary/Chest: Effort normal and breath sounds normal. No respiratory distress. She has no wheezes.   Abdominal: Soft. Bowel sounds are normal. She exhibits no distension. There is no tenderness.   Neurological: She is alert and oriented to person, place, and time.   Skin: Skin is warm and dry.       Significant Labs:     Recent Labs   Lab 01/30/20  0624   WBC 4.26   RBC 3.89*   HGB 11.0*   HCT 35.0*      MCV 90   MCH 28.3   MCHC 31.4*     Recent Labs   Lab 01/30/20  0624      K 3.7      CO2 29   BUN 7*   CREATININE 0.6       Significant Imaging: Echocardiogram:   Transthoracic echo (TTE) complete (Cupid Only):   Results for orders placed or performed during the hospital encounter of 01/24/20   Echo Color Flow Doppler? Yes   Result Value Ref Range    LVIDD 4.80 3.5 - 6.0 cm    IVS 1.50 0.6 - 1.1 cm    PW 1.00 0.6 - 1.1 cm    LVIDS 3.60 2.1 - 4.0 cm    FS 25 28 - 44 %    LV mass 232.25 g    LA size 2.10 cm    Left Ventricle Relative Wall Thickness 0.42 cm    LVOT diameter 2.00 cm    LVOT area 3.1 cm2    LV Mass Index 131 g/m2    Narrative    · Low normal left ventricular systolic function. The estimated ejection   fraction is 50%.  · Moderate concentric left ventricular hypertrophy.  · There is a left pleural effusion.  · No pericardial effusion        Assessment and Plan:     Brief HPI: Seen on AM rounds with Dr. Good while resting in bed with sister at the bedside. Complains of brief episode of SOB this morning. Discussed POC as detailed below-verbalized understanding and agrees with POC     * Pericardial effusion  -echocardiogram with large pericardial effusion with early tamponade physiology/ RA collapse; transferred to [facility] for further  treatment/management  -pericardiocentesis  with approximately 920cc with drain 1/28/2020  -fluid analysis in process with prelim cultures with staphylococcus hominis-will consult ID  -HR and BP stable; mild SOB this AM relieved with sitting up and drinking coffee; unlikely to be related to recurrent pericardial effusion but will repeat echo for completeness           Physical debility  -admitted previously at OSH with pneumonia with physical debility noted and transferred to rehab  -SOB with notation of pleural and pericardial effusion noted on CT with transfer back to [facility] and eventually to [facility] for pericardiocentesis  -eager to resume PT and get back to rehab  -PT and OT on board; notes reviewed with noted recommendation for return to SNF     Postsurgical cardiac pacemaker in situ  -PPM for  CHB  -appropriate pacemaker function noted on telemetry     Paroxysmal atrial fibrillation  -HR 60s-70s overnight; no arrhythmias noted on telemetry  -on Eliquis BID at home and held due to need for pericardiocentesis; maintained on SQ Heparin  for DVT prophylaxis after pericardiocentesis  -will resume Eliquis today   -continue to monitor on telemetry; continue Coreg     Hypertension  -SBP 140s-160s overnight  -on Coreg and Losartan HCTZ at home  -will continue Coreg and resume Losartan at 25mg po daily today; up titrate as needed for BP control           VTE Risk Mitigation (From admission, onward)         Ordered     apixaban tablet 5 mg  2 times daily      01/30/20 1004     Place sequential compression device  Until discontinued      01/25/20 1210     IP VTE LOW RISK PATIENT  Once      01/24/20 1919     Place LASHANDA hose  Until discontinued      01/24/20 1919                TSERING Sena, ANP  Cardiology  [facility]

## 2020-01-30 NOTE — PLAN OF CARE
Problem: Physical Therapy Goal  Goal: Physical Therapy Goal  Description  Goals to be met by: 2020     Patient will increase functional independence with mobility by performin. Supine to sit with Modified [place]  2. Sit to stand transfer with Modified [place]  3. Bed to chair transfer with Modified [place] using Rolling Walker  4. Gait  x 150 feet with Modified [place] using Rolling Walker.      Outcome: Ongoing, Progressing   Continue working toward goals.

## 2020-01-30 NOTE — PLAN OF CARE
Plan of care reviewed with patient, understanding verbalized.  Pt remains paced on tele. Bed alarm on, call light in reach, fall precautions in place.

## 2020-01-30 NOTE — PT/OT/SLP PROGRESS
Physical Therapy Treatment    Patient Name:  Dori Whatley   MRN:  [id number]    Recommendations:     Discharge Recommendations:  nursing facility, skilled   Discharge Equipment Recommendations: none   Barriers to discharge: None    Assessment:     Dori Whatley is a 79 y.o. female admitted with a medical diagnosis of Pericardial effusion.  She presents with the following impairments/functional limitations:  weakness, impaired endurance, impaired self care skills, impaired functional mobilty, gait instability, impaired balance, decreased coordination, decreased lower extremity function, decreased upper extremity function, decreased safety awareness, impaired skin. Pt would continue to benefit from P.T. To address impairments listed above.      Rehab Prognosis: Fair; patient would benefit from acute skilled PT services to address these deficits and reach maximum level of function.    Recent Surgery: Procedure(s) (LRB):  Pericardiocentesis (N/A) 3 Days Post-Op    Plan:     During this hospitalization, patient to be seen   to address the identified rehab impairments via gait training, therapeutic activities, therapeutic exercises and progress toward the following goals:    · Plan of Care Expires:  02/29/20    Subjective       Patient/Family Comments/goals: Pt agreed to tx.  Pain/Comfort:  · Pain Rating 1: 0/10  · Pain Rating Post-Intervention 1: 0/10      Objective:     Communicated with RN (ISRAEL) prior to session.  Patient found up in chair with PureWick, telemetry upon PT entry to room.     General Precautions: Standard, fall   Orthopedic Precautions:N/A   Braces:       Functional Mobility:  · Transfers:     · Sit to Stand:  minimum assistance and moderate assistance with rolling walker and vc's/tc's for hand placement.  Two bouts of standing  · Gait: '45ft x 2 with RW and [place] secondary to bouts of instability and standing rest break between bouts.  Pt demonstrated 2 bouts LOB secondary to scissors gait  and let go of RW grabbing chair rail on wall.  Elroy and vc's regain balance.  · Balance: sitting good, standing fair, gait fair-      AM-PAC 6 CLICK MOBILITY  Turning over in bed (including adjusting bedclothes, sheets and blankets)?: 3  Sitting down on and standing up from a chair with arms (e.g., wheelchair, bedside commode, etc.): 3  Moving from lying on back to sitting on the side of the bed?: 3  Moving to and from a bed to a chair (including a wheelchair)?: 3  Need to walk in hospital room?: 3  Climbing 3-5 steps with a railing?: 2  Basic Mobility Total Score: 17       Therapeutic Activities and Exercises:   Pt required close CGA with RW for Static standing while assisting pt with doffing and donning diaper and assist with hygiene.  Pt stands statically and ambulates with increased trunk flexion and head down posture.  Pt to bring trunk upright (not completey) and head upright, but unable to maintain for more than a few seconds when given vc's.    Seated BLE therex: AP, LAQ x 15 reps  BLE therex with LEs elevated in recliner: heelslides and hip ABD/ADD x 15 reps.    Patient left up in chair with all lines intact, call button in reach, chair alarm on and RN notified..    GOALS:   Multidisciplinary Problems     Physical Therapy Goals        Problem: Physical Therapy Goal    Goal Priority Disciplines Outcome Goal Variances Interventions   Physical Therapy Goal     PT, PT/OT Ongoing, Progressing     Description:  Goals to be met by: 2020     Patient will increase functional independence with mobility by performin. Supine to sit with Modified [place]  2. Sit to stand transfer with Modified [place]  3. Bed to chair transfer with Modified [place] using Rolling Walker  4. Gait  x 150 feet with Modified [place] using Rolling Walker.                       Time Tracking:     PT Received On: 20  PT Start Time: 1200     PT Stop Time: 1238  PT Total Time (min): 38 min     Billable  Minutes: Gait Training 15, Therapeutic Activity 12 and Therapeutic Exercise 11    Treatment Type: Treatment  PT/PTA: PTA     PTA Visit Number: 1     Karen Strickland PTA  01/30/2020

## 2020-01-30 NOTE — PLAN OF CARE
CM received notification of approval for skilled nursing at [facility] for admission tomorrow.  Will f/u with primary team for Facility Transfer Orders on tomorrow if medically ready for d/c.    If determined pt will need long term IV antibiotics, please place consult for PICC/Midline.      Karmen Navarro, RN    [phone]

## 2020-01-30 NOTE — ASSESSMENT & PLAN NOTE
-echocardiogram with large pericardial effusion with early tamponade physiology/ RA collapse; transferred to [facility] for further treatment/management  -pericardiocentesis  with approximately 920cc with drain 1/28/2020  -fluid analysis in process with prelim cultures with staphylococcus hominis-will consult ID  -HR and BP stable; mild SOB this AM relieved with sitting up and drinking coffee; unlikely to be related to recurrent pericardial effusion but will repeat echo for completeness

## 2020-01-30 NOTE — PT/OT/SLP PROGRESS
Occupational Therapy   Treatment    Name: Dori Whatley  MRN: [id number]  Admitting Diagnosis:  Pericardial effusion  3 Days Post-Op    Recommendations:     Discharge Recommendations: nursing facility, skilled  Discharge Equipment Recommendations:  none  Barriers to discharge:  None(if return to SNF )    Assessment:     Dori Whatley is a 79 y.o. female with a medical diagnosis of Pericardial effusion.  She presents with deconditioning. Performance deficits affecting function are weakness, impaired self care skills, impaired functional mobilty, impaired endurance, impaired balance, gait instability, decreased lower extremity function, decreased upper extremity function, impaired cognition, decreased safety awareness, impaired skin.     Pt progressing well towards goals. Cont OT POC     Rehab Prognosis:  Good; patient would benefit from acute skilled OT services to address these deficits and reach maximum level of function.       Plan:     Patient to be seen 5 x/week to address the above listed problems via self-care/home management, therapeutic activities, therapeutic exercises  · Plan of Care Expires: 02/29/20  · Plan of Care Reviewed with: patient    Subjective     Pain/Comfort:  · Pain Rating 1: 0/10    Objective:     Communicated with: nsg prior to session.    General Precautions: Standard, fall   Orthopedic Precautions:N/A   Braces: N/A     Occupational Performance:     Bed Mobility:    · Patient completed Scooting/Bridging with supervision  · Patient completed Supine to Sit with supervision     Functional Mobility/Transfers:  · Patient completed Sit <> Stand Transfer with contact guard assistance and minimum assistance  with  rolling walker   · Patient completed Bed <> Chair Transfer using Step Transfer technique with contact guard assistance with rolling walker  · Functional Mobility: CGA with RW; forward flexed posture     Activities of Daily Living:  · Lower Body Dressing: minimum assistance     · Toileting: maximal assistance and total assistance pure wick; BM x 2 in brief       AMPAC 6 Click ADL: 17    Treatment & Education:  Pt eager to participate   Bed mobility as above   Doffed B socks with figure 4 technique min A in order to adjust LASHANDA hose   Pt performing functional mobility x 2 with RW CGA; seated rest break 2/2 decreased ax tolerance   Stood from b/s chair x 1 to perform unilateral UE therex- 1 x 10 chest press LUE and 1 x 10 bicep curl R       Patient left up in chair with all lines intact, call button in reach, chair alarm on, nsg notified and sister  presentEducation:      GOALS:   Multidisciplinary Problems     Occupational Therapy Goals        Problem: Occupational Therapy Goal    Goal Priority Disciplines Outcome Interventions   Occupational Therapy Goal     OT, PT/OT Ongoing, Progressing    Description:  Goals to be met by: 2/29/20     Patient will increase functional independence with ADLs by performing:    LE Dressing with Supervision.  Grooming while standing with Supervision.  Toileting from toilet with Supervision for hygiene and clothing management.   Supine to sit with Supervision.  Step transfer with Supervision  Toilet transfer to toilet with Supervision.  Upper extremity exercise program x10 reps per handout, with independence.                      Time Tracking:     OT Date of Treatment: 01/30/20  OT Start Time: 1033  OT Stop Time: 1057  OT Total Time (min): 24 min    Billable Minutes:Therapeutic Activity 12  Therapeutic Exercise 12    Keena Tomlinson, OT  1/30/2020

## 2020-01-30 NOTE — ASSESSMENT & PLAN NOTE
-admitted previously at OSH with pneumonia with physical debility noted and transferred to rehab  -SOB with notation of pleural and pericardial effusion noted on CT with transfer back to [facility] and eventually to [facility] for pericardiocentesis  -eager to resume PT and get back to rehab  -PT and OT on board; notes reviewed with noted recommendation for return to SNF

## 2020-01-30 NOTE — PLAN OF CARE
Plan of care reviewed with patient and family.  Patient had 2D echo performed and infectious disease consult to bedside.  Patient also walked the hallways with physical therapy.  Patient now resting comfortably in bed, side rails up x3, call bell in reach, and bed locked in lowest position.

## 2020-01-30 NOTE — PLAN OF CARE
[facility] notified will need final ID recs prior to return.  Consult has been placed for positive cultures, possible contaminant.  Anticipate transfer tomorrow.    [employer] auth has been requested by facility, awaiting approval.       01/30/20 1153   Post-Acute Status   Post-Acute Authorization Placement   Post-Acute Placement Status Pending Payor Review

## 2020-01-30 NOTE — SUBJECTIVE & OBJECTIVE
Review of Systems   Constitution: Negative for chills, decreased appetite, diaphoresis, fever, malaise/fatigue, weight gain and weight loss.   Cardiovascular: Negative for chest pain, claudication, dyspnea on exertion, irregular heartbeat, leg swelling, near-syncope, orthopnea, palpitations and paroxysmal nocturnal dyspnea.   Respiratory: Positive for shortness of breath. Negative for cough, snoring, sputum production and wheezing.    Endocrine: Negative for cold intolerance, heat intolerance, polydipsia, polyphagia and polyuria.   Skin: Negative for color change, dry skin, itching, nail changes and poor wound healing.   Musculoskeletal: Negative for back pain, gout, joint pain and joint swelling.   Gastrointestinal: Negative for bloating, abdominal pain, constipation, diarrhea, hematemesis, hematochezia, melena, nausea and vomiting.   Genitourinary: Negative for dysuria, hematuria and nocturia.   Neurological: Negative for dizziness, headaches, light-headedness, numbness, paresthesias and weakness.   Psychiatric/Behavioral: Negative for altered mental status, depression and memory loss.     Objective:     Vital Signs (Most Recent):  Temp: 98.7 °F (37.1 °C) (01/30/20 0724)  Pulse: 84 (01/30/20 0817)  Resp: (!) 22 (01/30/20 0724)  BP: (!) 171/72 (01/30/20 0724)  SpO2: 95 % (01/30/20 0724) Vital Signs (24h Range):  Temp:  [97.6 °F (36.4 °C)-98.7 °F (37.1 °C)] 98.7 °F (37.1 °C)  Pulse:  [60-84] 84  Resp:  [17-22] 22  SpO2:  [94 %-96 %] 95 %  BP: (138-182)/(65-84) 171/72     Weight: 66.5 kg (146 lb 9.7 oz)  Body mass index is 22.96 kg/m².     SpO2: 95 %  O2 Device (Oxygen Therapy): room air      Intake/Output Summary (Last 24 hours) at 1/30/2020 1011  Last data filed at 1/30/2020 0900  Gross per 24 hour   Intake 957 ml   Output 2000 ml   Net -1043 ml       Lines/Drains/Airways     Drain            Female External Urinary Catheter 01/24/20 2123 5 days          Peripheral Intravenous Line                 Peripheral IV  - Single Lumen 01/27/20 2140 20 G Anterior;Left Forearm 2 days                Physical Exam   Constitutional: She is oriented to person, place, and time. She appears well-developed and well-nourished. No distress.   Cardiovascular: Normal rate and regular rhythm. Exam reveals no gallop.   No murmur heard.  Pulmonary/Chest: Effort normal and breath sounds normal. No respiratory distress. She has no wheezes.   Abdominal: Soft. Bowel sounds are normal. She exhibits no distension. There is no tenderness.   Neurological: She is alert and oriented to person, place, and time.   Skin: Skin is warm and dry.       Significant Labs:     Recent Labs   Lab 01/30/20  0624   WBC 4.26   RBC 3.89*   HGB 11.0*   HCT 35.0*      MCV 90   MCH 28.3   MCHC 31.4*     Recent Labs   Lab 01/30/20  0624      K 3.7      CO2 29   BUN 7*   CREATININE 0.6       Significant Imaging: Echocardiogram:   Transthoracic echo (TTE) complete (Cupid Only):   Results for orders placed or performed during the hospital encounter of 01/24/20   Echo Color Flow Doppler? Yes   Result Value Ref Range    LVIDD 4.80 3.5 - 6.0 cm    IVS 1.50 0.6 - 1.1 cm    PW 1.00 0.6 - 1.1 cm    LVIDS 3.60 2.1 - 4.0 cm    FS 25 28 - 44 %    LV mass 232.25 g    LA size 2.10 cm    Left Ventricle Relative Wall Thickness 0.42 cm    LVOT diameter 2.00 cm    LVOT area 3.1 cm2    LV Mass Index 131 g/m2    Narrative    · Low normal left ventricular systolic function. The estimated ejection   fraction is 50%.  · Moderate concentric left ventricular hypertrophy.  · There is a left pleural effusion.  · No pericardial effusion

## 2020-01-30 NOTE — PLAN OF CARE
Problem: Occupational Therapy Goal  Goal: Occupational Therapy Goal  Description  Goals to be met by: 2/29/20     Patient will increase functional independence with ADLs by performing:    LE Dressing with Supervision.  Grooming while standing with Supervision.  Toileting from toilet with Supervision for hygiene and clothing management.   Supine to sit with Supervision.  Step transfer with Supervision  Toilet transfer to toilet with Supervision.  Upper extremity exercise program x10 reps per handout, with independence.     Outcome: Ongoing, Progressing     Pt progressing well towards goals. Cont OT POC

## 2020-01-30 NOTE — NURSING
VN Round: Cued into patients room and adjusted camera with patients permission. Introduced myself as VN and explained I would be working with the bedside nurse. Bed low, locked and call bell within reach. Patient verbalized understanding to call for any needs or assistance. Family at patients bedside. No complaints of pain at this time. No questions at this time. Will continue to monitor patient.

## 2020-01-30 NOTE — ASSESSMENT & PLAN NOTE
-HR 60s-70s overnight; no arrhythmias noted on telemetry  -on Eliquis BID at home and held due to need for pericardiocentesis; maintained on SQ Heparin  for DVT prophylaxis after pericardiocentesis  -will resume Eliquis today   -continue to monitor on telemetry; continue Coreg

## 2020-01-31 LAB
ANION GAP SERPL CALC-SCNC: 7 MMOL/L (ref 8–16)
BACTERIA FLD AEROBE CULT: ABNORMAL
BASOPHILS # BLD AUTO: 0.01 K/UL (ref 0–0.2)
BASOPHILS NFR BLD: 0.2 % (ref 0–1.9)
BUN SERPL-MCNC: 6 MG/DL (ref 8–23)
CALCIUM SERPL-MCNC: 8.9 MG/DL (ref 8.7–10.5)
CHLORIDE SERPL-SCNC: 107 MMOL/L (ref 95–110)
CO2 SERPL-SCNC: 29 MMOL/L (ref 23–29)
CREAT SERPL-MCNC: 0.6 MG/DL (ref 0.5–1.4)
DIFFERENTIAL METHOD: ABNORMAL
EOSINOPHIL # BLD AUTO: 0.1 K/UL (ref 0–0.5)
EOSINOPHIL NFR BLD: 2.1 % (ref 0–8)
ERYTHROCYTE [DISTWIDTH] IN BLOOD BY AUTOMATED COUNT: 14.6 % (ref 11.5–14.5)
EST. GFR  (AFRICAN AMERICAN): >60 ML/MIN/1.73 M^2
EST. GFR  (NON AFRICAN AMERICAN): >60 ML/MIN/1.73 M^2
GLUCOSE SERPL-MCNC: 132 MG/DL (ref 70–110)
GRAM STN SPEC: ABNORMAL
GRAM STN SPEC: ABNORMAL
HCT VFR BLD AUTO: 36.6 % (ref 37–48.5)
HGB BLD-MCNC: 11.3 G/DL (ref 12–16)
LYMPHOCYTES # BLD AUTO: 1.4 K/UL (ref 1–4.8)
LYMPHOCYTES NFR BLD: 32.2 % (ref 18–48)
MCH RBC QN AUTO: 27.8 PG (ref 27–31)
MCHC RBC AUTO-ENTMCNC: 30.9 G/DL (ref 32–36)
MCV RBC AUTO: 90 FL (ref 82–98)
MONOCYTES # BLD AUTO: 0.5 K/UL (ref 0.3–1)
MONOCYTES NFR BLD: 12 % (ref 4–15)
NEUTROPHILS # BLD AUTO: 2.3 K/UL (ref 1.8–7.7)
NEUTROPHILS NFR BLD: 53.5 % (ref 38–73)
PLATELET # BLD AUTO: 189 K/UL (ref 150–350)
PMV BLD AUTO: 11.3 FL (ref 9.2–12.9)
POCT GLUCOSE: 119 MG/DL (ref 70–110)
POCT GLUCOSE: 120 MG/DL (ref 70–110)
POCT GLUCOSE: 138 MG/DL (ref 70–110)
POCT GLUCOSE: 152 MG/DL (ref 70–110)
POTASSIUM SERPL-SCNC: 3.7 MMOL/L (ref 3.5–5.1)
PROT FLD-MCNC: 4.6 G/DL
RBC # BLD AUTO: 4.07 M/UL (ref 4–5.4)
SODIUM SERPL-SCNC: 143 MMOL/L (ref 136–145)
SPECIMEN SOURCE: NORMAL
WBC # BLD AUTO: 4.32 K/UL (ref 3.9–12.7)

## 2020-01-31 PROCEDURE — 36415 COLL VENOUS BLD VENIPUNCTURE: CPT

## 2020-01-31 PROCEDURE — 25000003 PHARM REV CODE 250: Performed by: NURSE PRACTITIONER

## 2020-01-31 PROCEDURE — 97116 GAIT TRAINING THERAPY: CPT | Mod: CQ

## 2020-01-31 PROCEDURE — 97110 THERAPEUTIC EXERCISES: CPT | Mod: CQ

## 2020-01-31 PROCEDURE — 97530 THERAPEUTIC ACTIVITIES: CPT | Mod: CO

## 2020-01-31 PROCEDURE — 99233 SBSQ HOSP IP/OBS HIGH 50: CPT | Mod: ,,, | Performed by: STUDENT IN AN ORGANIZED HEALTH CARE EDUCATION/TRAINING PROGRAM

## 2020-01-31 PROCEDURE — 11000001 HC ACUTE MED/SURG PRIVATE ROOM

## 2020-01-31 PROCEDURE — 80048 BASIC METABOLIC PNL TOTAL CA: CPT

## 2020-01-31 PROCEDURE — 94761 N-INVAS EAR/PLS OXIMETRY MLT: CPT

## 2020-01-31 PROCEDURE — 99233 PR SUBSEQUENT HOSPITAL CARE,LEVL III: ICD-10-PCS | Mod: ,,, | Performed by: STUDENT IN AN ORGANIZED HEALTH CARE EDUCATION/TRAINING PROGRAM

## 2020-01-31 PROCEDURE — 97535 SELF CARE MNGMENT TRAINING: CPT | Mod: CO

## 2020-01-31 PROCEDURE — 87040 BLOOD CULTURE FOR BACTERIA: CPT | Mod: 59

## 2020-01-31 PROCEDURE — 85025 COMPLETE CBC W/AUTO DIFF WBC: CPT

## 2020-01-31 PROCEDURE — 63600175 PHARM REV CODE 636 W HCPCS: Performed by: INTERNAL MEDICINE

## 2020-01-31 RX ORDER — CEFAZOLIN SODIUM 2 G/50ML
2 SOLUTION INTRAVENOUS
Status: DISCONTINUED | OUTPATIENT
Start: 2020-01-31 | End: 2020-02-03 | Stop reason: HOSPADM

## 2020-01-31 RX ADMIN — ACETAMINOPHEN 650 MG: 325 TABLET ORAL at 08:01

## 2020-01-31 RX ADMIN — CLOPIDOGREL BISULFATE 75 MG: 75 TABLET, FILM COATED ORAL at 09:01

## 2020-01-31 RX ADMIN — CARVEDILOL 6.25 MG: 6.25 TABLET, FILM COATED ORAL at 08:01

## 2020-01-31 RX ADMIN — CARVEDILOL 6.25 MG: 6.25 TABLET, FILM COATED ORAL at 09:01

## 2020-01-31 RX ADMIN — GABAPENTIN 300 MG: 300 CAPSULE ORAL at 08:01

## 2020-01-31 RX ADMIN — PANTOPRAZOLE SODIUM 40 MG: 40 TABLET, DELAYED RELEASE ORAL at 09:01

## 2020-01-31 RX ADMIN — APIXABAN 5 MG: 5 TABLET, FILM COATED ORAL at 08:01

## 2020-01-31 RX ADMIN — GABAPENTIN 300 MG: 300 CAPSULE ORAL at 05:01

## 2020-01-31 RX ADMIN — CEFAZOLIN SODIUM 2 G: 2 SOLUTION INTRAVENOUS at 05:01

## 2020-01-31 RX ADMIN — Medication 6 MG: at 08:01

## 2020-01-31 RX ADMIN — DULOXETINE 30 MG: 30 CAPSULE, DELAYED RELEASE ORAL at 09:01

## 2020-01-31 RX ADMIN — GABAPENTIN 300 MG: 300 CAPSULE ORAL at 09:01

## 2020-01-31 RX ADMIN — APIXABAN 5 MG: 5 TABLET, FILM COATED ORAL at 09:01

## 2020-01-31 RX ADMIN — LOSARTAN POTASSIUM 25 MG: 25 TABLET, FILM COATED ORAL at 09:01

## 2020-01-31 NOTE — CONSULTS
"ID Staff - [place]    Asked top see patient for positive culture from pericardiocentesis.    Note: details of history are derived from other notes in this chart - have not yet been able to review records from previous hospitalization    78 y/o woman with PMH as below admitted Jan 8 with pneumonia (LLL) treated with rocephin followed by cefdinir,(to finish 14 day course).  This admission was complicated by REGINO (creat peak 3.67), shock liver (AST peak 4000, ALT peak 3600), and thrombocytopenia - all resolved by discharge.  NO positive cultures,  Negative viral respiratory panel. Negative testsfor bordatella, chlamydia and mycoplasma.  Blood cultures x 2 negative    She  was discharged to a step-down unit.,  Unknown if patient was in a facility prior to hospitalization.  She then developed respiratory symptoms, was sent to a different hospital where evaluation showed pleural and pericardial effusions, and she was transferred here.       My understanding is that her antibiotics stopped about Jan 22.  At this facility she had drainage of the pericardial space - has been afebrile with normal labs (WBC, chemistries)    Has right non displaced humeral fracture from fall Nov 2019.    NOTE:  Had breast cancer about 30 yrs ago, with mastectomy right breast     Three back surgeries with hardware in lumbar spine  Past Medical History:   Diagnosis Date    Arthritis     Cancer     breast    Chronic back pain     Diabetes mellitus     Hypertension     Stroke     minor one      apixaban  5 mg Oral BID    carvedilol  6.25 mg Oral BID    clopidogrel  75 mg Oral Daily    DULoxetine  30 mg Oral Daily    gabapentin  300 mg Oral TID    losartan  25 mg Oral Daily    pantoprazole  40 mg Oral Daily     Exam:  /60   Pulse 65   Temp 98 °F (36.7 °C) (Oral)   Resp 20   Ht 5' 7" (1.702 m)   Wt 66.5 kg (146 lb 9.7 oz)   SpO2 96%   Breastfeeding? No   BMI 22.96 kg/m²   Alert, cooperative - no complaints except for fleeting " discomfort anterior chest area - cannot really describe this nor quantify it  Skin: some scattered subQ contusion, no rash  HEENT: EOMI, conjuctiva normal nose, clear, mouth - pharunx clear, tongue midline - upper dentures, no teeth bottom  Neck supple no nodes  Lungs - good air movement, decreased left base posteriorly, no wheeze  Heart RRR no m/g  Abd: soft non tender, no masses  Ext: no edema, good peripheral pulses    Lab:  Recent Labs   Lab 01/30/20  0624   WBC 4.26   RBC 3.89*   HGB 11.0*   HCT 35.0*      MCV 90   MCH 28.3   MCHC 31.4*        Pericardial fluid - no chemistry results available,       Serosanguinous - WBC 1600  67% L PH 7.9  Recent Labs   Lab 01/30/20  0624      K 3.7      CO2 29   BUN 7*   CREATININE 0.6     Microbiology Results (last 7 days)     Procedure Component Value Units Date/Time    Culture, Body Fluid (Aerobic) w/ Gram Stain [[id number]]  (Abnormal) Collected:  01/27/20 1453    Order Status:  Completed Specimen:  Body Fluid from Pericardial Fluid Updated:  01/30/20 1126     AEROBIC CULTURE - FLUID STAPHYLOCOCCUS HOMINIS  Rare  Susceptibility pending       Gram Stain Result Rare WBC's      No organisms seen    AFB Culture & Smear [[id number]] Collected:  01/27/20 1453    Order Status:  Completed Specimen:  Body Fluid from Pericardial Fluid Updated:  01/28/20 2127     AFB Culture & Smear Culture in progress     AFB CULTURE STAIN No acid fast bacilli seen.    Gram stain [[id number]]     Order Status:  Canceled Specimen:  Body Fluid from Pleural Fluid     Aerobic culture [[id number]]     Order Status:  Canceled Specimen:  Body Fluid from Pleural Fluid     AFB Culture & Smear [[id number]]     Order Status:  Canceled Specimen:  Body Fluid from Pleural Fluid     Culture, Anaerobic [[id number]]     Order Status:  Canceled Specimen:  Body Fluid from Pleural Fluid     Fungus culture [[id number]]     Order Status:  Canceled Specimen:  Body Fluid from Pleural Fluid     Fungus culture  [[id number]] Collected:  01/27/20 1453    Order Status:  Completed Specimen:  Body Fluid from Pericardial Fluid Updated:  01/28/20 0949     Fungus (Mycology) Culture Culture in progress    Gram stain [[id number]] Collected:  01/27/20 1453    Order Status:  Completed Specimen:  Body Fluid from Pericardial Fluid Updated:  01/27/20 2058     Gram Stain Result Rare WBC's      No organisms seen        Imaging reviewed:    Repeat ECHO done today - no pericardial effusion    IMP and REC:    Elderly woman s/p severe LLL pneumonia/pleural effusion and sepsis now with pericardial effusion that was not initially suspected of being of bacterial origin (CMV done on blood, negative, viral work up done when had pneumonia), was serosanguinous in nature - now with rare coagulase negative staph on culture.    On no antibiotics for past week,m WBC and temperature has been normal.  Repeat ECHO - no pericardial effusion    She is at risk for issues with this organism as has pacemaker placed Nov 6, 2019.     Please get blood cultures x 2.  Full rec's to follow - awaiting sensitviity report

## 2020-01-31 NOTE — PROGRESS NOTES
[facility]  Infectious Disease  Progress Note    Patient Name: Dori Whatley  MRN: [id number]  Admission Date: 1/24/2020  Length of Stay: 7 days  Attending Physician: Vadim Good MD  Primary Care Provider: Tru Sanchez MD    Isolation Status: No active isolations  Assessment/Plan:      * Pericardial effusion  80 y/o with pericardial effusion s/p tap on 1/27/2020 with WBC count of 1295 (26% segs, 67% lymphs), cytology negative for malignant cells; had recent pneumonia and pleural effusions; has history of back surgeries with hardware in place and pacemaker in place. Pericardial fluid - the tap was easy and it was bloody and grew staph hominis sensitive to oxacillin. BC pending.     Rec:  Start cefazolin for staph hominis pericardial culture  Await BC          Anticipated Disposition: unclear at this point - will likely need long term IV antibiotics for positive pericardial fluid culture. Unclear duration currently - will check.     Thank you for your consult. I will follow-up with patient. Please contact us if you have any additional questions.[phone].     Mackenzie Lama MD  Infectious Disease  [facility]    Subjective:     Principal Problem:Pericardial effusion    HPI: 1/30/20 Asked to see patient for positive culture from pericardiocentesis. Note: details of history are derived from other notes in this chart - have not yet been able to review records from previous hospitalization     80 y/o woman with PMH as below admitted Jan 8 with pneumonia (LLL) treated with rocephin followed by cefdinir,(to finish 14 day course).  This admission was complicated by REGINO (creat peak 3.67), shock liver (AST peak 4000, ALT peak 3600), and thrombocytopenia - all resolved by discharge.  NO positive cultures,  Negative viral respiratory panel. Negative testsfor bordatella, chlamydia and mycoplasma.  Blood cultures x 2 negative     She  was discharged to a step-down unit.,   Unknown if patient was in a facility prior to hospitalization.  She then developed respiratory symptoms, was sent to a different hospital where evaluation showed pleural and pericardial effusions, and she was transferred here to [facility] on 1/24/2020.       My understanding is that her antibiotics stopped about Jan 22, 2020.  At this facility she had drainage of the pericardial space - has been afebrile with normal labs (WBC, chemistries)     Has right non displaced humeral fracture from fall Nov 2019.     NOTE:  Had breast cancer about 30 yrs ago, with mastectomy right breast                Three back surgeries with hardware in lumbar spine  1/30 - BC pending  1/31 Discussed case with patient and family member and Cardiology team - await staph hominis sensitivities.  Interval History: Discussed with patient and family member about possibly starting antibiotics. The staph sensitivities came back today and it is sensitive to cefazolin. Will start that today. Discussed with Cardiology about starting antibiotics as well.     Review of Systems   Respiratory: Negative for shortness of breath.    Gastrointestinal: Negative for diarrhea, nausea and vomiting.     Antibiotics (From admission, onward)    None        Objective:     Vital Signs (Most Recent):  Temp: 98.1 °F (36.7 °C) (01/31/20 1201)  Pulse: 74 (01/31/20 1603)  Resp: 18 (01/31/20 1201)  BP: 132/63 (01/31/20 1201)  SpO2: 96 % (01/31/20 0749) Vital Signs (24h Range):  Temp:  [96.6 °F (35.9 °C)-99.5 °F (37.5 °C)] 98.1 °F (36.7 °C)  Pulse:  [57-85] 74  Resp:  [17-20] 18  SpO2:  [93 %-96 %] 96 %  BP: (113-163)/(60-72) 132/63     Weight: 63.1 kg (139 lb 1.8 oz)  Body mass index is 21.79 kg/m².    Estimated Creatinine Clearance: 73.9 mL/min (based on SCr of 0.6 mg/dL).    Physical Exam   Cardiovascular:   Murmur heard.  2/6 JAEL at LSB; has bandage under xyphoid   Pulmonary/Chest: Breath sounds normal.   Abdominal: Soft. Bowel sounds are normal. She exhibits no distension.  There is no tenderness.   Musculoskeletal: She exhibits no edema.       Significant Labs:   CBC:   Recent Labs   Lab 20  0624 20  0602   WBC 4.26 4.32   HGB 11.0* 11.3*   HCT 35.0* 36.6*    189     CMP:   Recent Labs   Lab 20  0624 20  0602    143   K 3.7 3.7    107   CO2 29 29   * 132*   BUN 7* 6*   CREATININE 0.6 0.6   CALCIUM 8.4* 8.9   ANIONGAP 6* 7*   EGFRNONAA >60 >60     Urine Studies:   Recent Labs   Lab 20  1605   COLORU Yellow   APPEARANCEUA Clear   PHUR 7.0   SPECGRAV 1.010   PROTEINUA Negative   GLUCUA Negative   KETONESU Negative   BILIRUBINUA Negative   OCCULTUA Negative   NITRITE Negative   UROBILINOGEN 1.0   LEUKOCYTESUR Negative      pericardial fluid   Staphylococcus hominis subsp. hominis       CULTURE, FLUID  (AEROBIC) WITH GRAM STAIN     Clindamycin <=0.5 mcg/mL Sensitive     Erythromycin <=0.5 mcg/mL Sensitive     Oxacillin <=0.25 mcg/mL Sensitive     Penicillin 2 mcg/mL Resistant     Tetracycline <=4 mcg/mL Sensitive     Trimeth/Sulfa <=0.5/9.5 m... Sensitive        Significant Imagin/28/20 IR US   Trace pleural effusions which are insufficient for therapeutic thoracentesis.  A specimen was not needed today.  Can be re-attempted if there is persistent concern.    Above discussed with Dr. Good who is in agreement.

## 2020-01-31 NOTE — PLAN OF CARE
Updated Julio at [facility] with lab results, no final ID rec.  Informed facility cannot accept late today and no intake available on tomorrow.  Will revisit on Monday with final plan.    Karmen Navarro RN    [phone]

## 2020-01-31 NOTE — ASSESSMENT & PLAN NOTE
78 y/o with pericardial effusion s/p tap on 1/27/2020 with WBC count of 1295 (26% segs, 67% lymphs), cytology negative for malignant cells; had recent pneumonia and pleural effusions; has history of back surgeries with hardware in place and pacemaker in place. Pericardial fluid - the tap was easy and it was bloody and grew staph hominis sensitive to oxacillin. BC pending.     Rec:  Start cefazolin for staph hominis pericardial culture  Await BC

## 2020-01-31 NOTE — PROGRESS NOTES
[facility]  Cardiology  Progress Note    Patient Name: Dori Whatley  MRN: [id number]  Admission Date: 1/24/2020  Hospital Length of Stay: 7 days  Code Status: Full Code   Attending Physician: Vadim Good MD   Primary Care Physician: Tru Sanchez MD  Expected Discharge Date:   Principal Problem:Pericardial effusion    Subjective:     Hospital Course:   1/25/2020 Transferred from [place] with pericardial effusion in need for pericardiocentesis. Echo at [facility] with large pericardial effusion with early tamponade physiology/RA collapse. BP stable and Eliquis held.   1/26/2020 Patient is doing well, Had episode of mild resp distress required NC o2. Now of oxygen and doing well.   1/27/2020 NPO for pericardiocentesis today  1/28/2020 Pericardiocentesis yesterday with approximately 920cc removed with fluid sent to lab for analysis. Admitted to ICU with pericardial drain in place. No output overnight per bedside RN with notation of 5-10cc this morning so far. Patient reports breathing improved and able to lie flat overnight. HR and BP stable overnight. Decrease of urine output noted yesterday with NS at 75cc/hr ordered with 750cc out overnight -2.8 liters since admission. Will plan for repeat limited echo today with pericardial drain removal once output decreased/non existent   1/29/2020 Repeat echo yesterday with resolution of pericardial effusion and removal of drain. IR consulted with minimal/trace pleural effusions therefore no repeat thoracentesis needed. Transferred to floor. HR and BP stable. PT and OT on board. Planning to discharge to rehab in [place] once approval obtained   1/30/2020 HR stable overnight. BP 140s-160s/60s overnight. CBC and BMP stable. Complaints of SOB this morning relieved with sitting up and drinking coffee. Culture with staphylococcus hominin on prelim report-will consult ID   01/31/2020 Hemodynamically stable, afebrile. No further SOB. Blood cultures  obtained per ID recs. Patient ambulating in carr with assistance.         Review of Systems   Constitution: Negative for chills, decreased appetite, diaphoresis, fever, malaise/fatigue, weight gain and weight loss.   Cardiovascular: Negative for chest pain, claudication, dyspnea on exertion, irregular heartbeat, leg swelling, near-syncope, orthopnea, palpitations and paroxysmal nocturnal dyspnea.   Respiratory: Negative for cough, shortness of breath, snoring, sputum production and wheezing.    Endocrine: Negative for cold intolerance, heat intolerance, polydipsia, polyphagia and polyuria.   Skin: Negative for color change, dry skin, itching, nail changes and poor wound healing.   Musculoskeletal: Negative for back pain, gout, joint pain and joint swelling.   Gastrointestinal: Negative for bloating, abdominal pain, constipation, diarrhea, hematemesis, hematochezia, melena, nausea and vomiting.   Genitourinary: Negative for dysuria, hematuria and nocturia.   Neurological: Negative for dizziness, headaches, light-headedness, numbness, paresthesias and weakness.   Psychiatric/Behavioral: Negative for altered mental status, depression and memory loss.     Objective:     Vital Signs (Most Recent):  Temp: 98.1 °F (36.7 °C) (01/31/20 1201)  Pulse: 78 (01/31/20 1201)  Resp: 18 (01/31/20 1201)  BP: 132/63 (01/31/20 1201)  SpO2: 96 % (01/31/20 0749) Vital Signs (24h Range):  Temp:  [96.6 °F (35.9 °C)-99.5 °F (37.5 °C)] 98.1 °F (36.7 °C)  Pulse:  [57-85] 78  Resp:  [17-20] 18  SpO2:  [93 %-97 %] 96 %  BP: (113-163)/(60-72) 132/63     Weight: 63.1 kg (139 lb 1.8 oz)  Body mass index is 21.79 kg/m².     SpO2: 96 %  O2 Device (Oxygen Therapy): room air      Intake/Output Summary (Last 24 hours) at 1/31/2020 1454  Last data filed at 1/31/2020 1400  Gross per 24 hour   Intake 480 ml   Output 1520 ml   Net -1040 ml       Lines/Drains/Airways     Drain            Female External Urinary Catheter 01/24/20 2123 6 days           Peripheral Intravenous Line                 Peripheral IV - Single Lumen 01/27/20 2140 20 G Anterior;Left Forearm 3 days                Physical Exam   Constitutional: She is oriented to person, place, and time. She appears well-developed and well-nourished. No distress.   Cardiovascular: Normal rate and regular rhythm. Exam reveals no gallop.   No murmur heard.  Pulmonary/Chest: Effort normal and breath sounds normal. No respiratory distress. She has no wheezes.   Abdominal: Soft. Bowel sounds are normal. She exhibits no distension. There is no tenderness.   Neurological: She is alert and oriented to person, place, and time.   Skin: Skin is warm and dry.       Significant Labs:     Recent Labs   Lab 01/31/20  0602   WBC 4.32   RBC 4.07   HGB 11.3*   HCT 36.6*      MCV 90   MCH 27.8   MCHC 30.9*     Recent Labs   Lab 01/31/20  0602      K 3.7      CO2 29   BUN 6*   CREATININE 0.6       Significant Imaging: Echocardiogram:   Transthoracic echo (TTE) complete (Cupid Only):   Results for orders placed or performed during the hospital encounter of 01/24/20   Echo Color Flow Doppler? Yes    Narrative    · Normal EF  · Pericardial effusion is resolved  · There is a pleural effusion        Assessment and Plan:     Brief HPI: Patient seen this morning on rounds without CV complaint. POC discussed for blood cultures.     * Pericardial effusion  -echocardiogram with large pericardial effusion with early tamponade physiology/ RA collapse; transferred to [facility] for further treatment/management  -pericardiocentesis  with approximately 920cc with drain 1/28/2020  -fluid analysis in process with prelim cultures with staphylococcus hominis  -ID on board. Blood cultures obtained on 01/31           Physical debility  -admitted previously at OSH with pneumonia with physical debility noted and transferred to rehab  -SOB with notation of pleural and pericardial effusion noted on CT with transfer back to [facility] and  eventually to [facility] for pericardiocentesis  -PT and OT on board; notes reviewed with noted recommendation for return to SNF     Postsurgical cardiac pacemaker in situ  -PPM for  CHB  -appropriate pacemaker function noted on telemetry     Paroxysmal atrial fibrillation  -HR 60s-70s overnight; no arrhythmias noted on telemetry  -on Eliquis   -continue to monitor on telemetry; continue Coreg     Hypertension  -SBP 140s-160s overnight  - continue Coreg and resume Losartan at 25mg po daily today; up titrate as needed for BP control           VTE Risk Mitigation (From admission, onward)         Ordered     apixaban tablet 5 mg  2 times daily      01/30/20 1004     Place sequential compression device  Until discontinued      01/25/20 1210     IP VTE LOW RISK PATIENT  Once      01/24/20 1919     Place LASHANDA hose  Until discontinued      01/24/20 1919                Luis Armando Cates NP  Cardiology  [facility]

## 2020-01-31 NOTE — NURSING
VN Round: Cued into patients room and adjusted camera with patients permission. Introduced myself as VN and explained I would be working with the bedside nurse. Bed low, locked and call bell within reach. Patient verbalized understanding to call for any needs or assistance. No complaints of pain at this time. No questions at this time. Will continue to monitor patient.

## 2020-01-31 NOTE — ASSESSMENT & PLAN NOTE
-HR 60s-70s overnight; no arrhythmias noted on telemetry  -on Eliquis   -continue to monitor on telemetry; continue Coreg

## 2020-01-31 NOTE — SUBJECTIVE & OBJECTIVE
Interval History: Discussed with patient and family member about possibly starting antibiotics. The staph sensitivities came back today and it is sensitive to cefazolin. Will start that today. Discussed with Cardiology about starting antibiotics as well.     Review of Systems   Respiratory: Negative for shortness of breath.    Gastrointestinal: Negative for diarrhea, nausea and vomiting.     Antibiotics (From admission, onward)    None        Objective:     Vital Signs (Most Recent):  Temp: 98.1 °F (36.7 °C) (01/31/20 1201)  Pulse: 74 (01/31/20 1603)  Resp: 18 (01/31/20 1201)  BP: 132/63 (01/31/20 1201)  SpO2: 96 % (01/31/20 0749) Vital Signs (24h Range):  Temp:  [96.6 °F (35.9 °C)-99.5 °F (37.5 °C)] 98.1 °F (36.7 °C)  Pulse:  [57-85] 74  Resp:  [17-20] 18  SpO2:  [93 %-96 %] 96 %  BP: (113-163)/(60-72) 132/63     Weight: 63.1 kg (139 lb 1.8 oz)  Body mass index is 21.79 kg/m².    Estimated Creatinine Clearance: 73.9 mL/min (based on SCr of 0.6 mg/dL).    Physical Exam   Cardiovascular:   Murmur heard.  2/6 JAEL at LSB; has bandage under xyphoid   Pulmonary/Chest: Breath sounds normal.   Abdominal: Soft. Bowel sounds are normal. She exhibits no distension. There is no tenderness.   Musculoskeletal: She exhibits no edema.       Significant Labs:   CBC:   Recent Labs   Lab 01/30/20  0624 01/31/20  0602   WBC 4.26 4.32   HGB 11.0* 11.3*   HCT 35.0* 36.6*    189     CMP:   Recent Labs   Lab 01/30/20  0624 01/31/20  0602    143   K 3.7 3.7    107   CO2 29 29   * 132*   BUN 7* 6*   CREATININE 0.6 0.6   CALCIUM 8.4* 8.9   ANIONGAP 6* 7*   EGFRNONAA >60 >60     Urine Studies:   Recent Labs   Lab 01/26/20  1605   COLORU Yellow   APPEARANCEUA Clear   PHUR 7.0   SPECGRAV 1.010   PROTEINUA Negative   GLUCUA Negative   KETONESU Negative   BILIRUBINUA Negative   OCCULTUA Negative   NITRITE Negative   UROBILINOGEN 1.0   LEUKOCYTESUR Negative     1/27 pericardial fluid   Staphylococcus hominis subsp.  hominis       CULTURE, FLUID  (AEROBIC) WITH GRAM STAIN     Clindamycin <=0.5 mcg/mL Sensitive     Erythromycin <=0.5 mcg/mL Sensitive     Oxacillin <=0.25 mcg/mL Sensitive     Penicillin 2 mcg/mL Resistant     Tetracycline <=4 mcg/mL Sensitive     Trimeth/Sulfa <=0.5/9.5 m... Sensitive        Significant Imagin/28/20 IR US   Trace pleural effusions which are insufficient for therapeutic thoracentesis.  A specimen was not needed today.  Can be re-attempted if there is persistent concern.    Above discussed with Dr. Good who is in agreement.

## 2020-01-31 NOTE — PLAN OF CARE
Problem: Physical Therapy Goal  Goal: Physical Therapy Goal  Description  Goals to be met by: 2020     Patient will increase functional independence with mobility by performin. Supine to sit with Modified [place]  2. Sit to stand transfer with Modified [place]  3. Bed to chair transfer with Modified [place] using Rolling Walker  4. Gait  x 150 feet with Modified [place] using Rolling Walker.      Outcome: Ongoing, Progressing   Goals ongoing

## 2020-01-31 NOTE — PLAN OF CARE
Plan of care reviewed with patient, understanding verbalized.  Family member at bedside. Pt remains paced on tele. No complaints overnight.  Bed alarm on, call light in reach, fall precautions in place.

## 2020-01-31 NOTE — ASSESSMENT & PLAN NOTE
-SBP 140s-160s overnight  - continue Coreg and resume Losartan at 25mg po daily today; up titrate as needed for BP control

## 2020-01-31 NOTE — PLAN OF CARE
Patient sleeping when VN qued into room. Eyes closed. Respirations even and unlabored.  All safety measures maintained including bed locked, in lowest position with alarm on.  Call light within reach. Family at bedside denied any questions or complaints and stated that the staff is very helpful with everything.

## 2020-01-31 NOTE — PLAN OF CARE
Problem: Occupational Therapy Goal  Goal: Occupational Therapy Goal  Description  Goals to be met by: 2/29/20     Patient will increase functional independence with ADLs by performing:    LE Dressing with Supervision.  Grooming while standing with Supervision.  Toileting from toilet with Supervision for hygiene and clothing management.   Supine to sit with Supervision.  Step transfer with Supervision  Toilet transfer to toilet with Supervision.  Upper extremity exercise program x10 reps per handout, with independence.     Outcome: Ongoing, Progressing     Patient pleasant and eager to participate in therapy. Improving with functional transitions and bed mobility. Patient will benefit from return to SNF for continued therapies.

## 2020-01-31 NOTE — ASSESSMENT & PLAN NOTE
-echocardiogram with large pericardial effusion with early tamponade physiology/ RA collapse; transferred to [facility] for further treatment/management  -pericardiocentesis  with approximately 920cc with drain 1/28/2020  -fluid analysis in process with prelim cultures with staphylococcus hominis  -ID on board. Blood cultures obtained on 01/31

## 2020-01-31 NOTE — ASSESSMENT & PLAN NOTE
-admitted previously at OSH with pneumonia with physical debility noted and transferred to rehab  -SOB with notation of pleural and pericardial effusion noted on CT with transfer back to [facility] and eventually to [facility] for pericardiocentesis  -PT and OT on board; notes reviewed with noted recommendation for return to SNF

## 2020-01-31 NOTE — SUBJECTIVE & OBJECTIVE
Review of Systems   Constitution: Negative for chills, decreased appetite, diaphoresis, fever, malaise/fatigue, weight gain and weight loss.   Cardiovascular: Negative for chest pain, claudication, dyspnea on exertion, irregular heartbeat, leg swelling, near-syncope, orthopnea, palpitations and paroxysmal nocturnal dyspnea.   Respiratory: Negative for cough, shortness of breath, snoring, sputum production and wheezing.    Endocrine: Negative for cold intolerance, heat intolerance, polydipsia, polyphagia and polyuria.   Skin: Negative for color change, dry skin, itching, nail changes and poor wound healing.   Musculoskeletal: Negative for back pain, gout, joint pain and joint swelling.   Gastrointestinal: Negative for bloating, abdominal pain, constipation, diarrhea, hematemesis, hematochezia, melena, nausea and vomiting.   Genitourinary: Negative for dysuria, hematuria and nocturia.   Neurological: Negative for dizziness, headaches, light-headedness, numbness, paresthesias and weakness.   Psychiatric/Behavioral: Negative for altered mental status, depression and memory loss.     Objective:     Vital Signs (Most Recent):  Temp: 98.1 °F (36.7 °C) (01/31/20 1201)  Pulse: 78 (01/31/20 1201)  Resp: 18 (01/31/20 1201)  BP: 132/63 (01/31/20 1201)  SpO2: 96 % (01/31/20 0749) Vital Signs (24h Range):  Temp:  [96.6 °F (35.9 °C)-99.5 °F (37.5 °C)] 98.1 °F (36.7 °C)  Pulse:  [57-85] 78  Resp:  [17-20] 18  SpO2:  [93 %-97 %] 96 %  BP: (113-163)/(60-72) 132/63     Weight: 63.1 kg (139 lb 1.8 oz)  Body mass index is 21.79 kg/m².     SpO2: 96 %  O2 Device (Oxygen Therapy): room air      Intake/Output Summary (Last 24 hours) at 1/31/2020 1454  Last data filed at 1/31/2020 1400  Gross per 24 hour   Intake 480 ml   Output 1520 ml   Net -1040 ml       Lines/Drains/Airways     Drain            Female External Urinary Catheter 01/24/20 2123 6 days          Peripheral Intravenous Line                 Peripheral IV - Single Lumen  01/27/20 2140 20 G Anterior;Left Forearm 3 days                Physical Exam   Constitutional: She is oriented to person, place, and time. She appears well-developed and well-nourished. No distress.   Cardiovascular: Normal rate and regular rhythm. Exam reveals no gallop.   No murmur heard.  Pulmonary/Chest: Effort normal and breath sounds normal. No respiratory distress. She has no wheezes.   Abdominal: Soft. Bowel sounds are normal. She exhibits no distension. There is no tenderness.   Neurological: She is alert and oriented to person, place, and time.   Skin: Skin is warm and dry.       Significant Labs:     Recent Labs   Lab 01/31/20  0602   WBC 4.32   RBC 4.07   HGB 11.3*   HCT 36.6*      MCV 90   MCH 27.8   MCHC 30.9*     Recent Labs   Lab 01/31/20  0602      K 3.7      CO2 29   BUN 6*   CREATININE 0.6       Significant Imaging: Echocardiogram:   Transthoracic echo (TTE) complete (Cupid Only):   Results for orders placed or performed during the hospital encounter of 01/24/20   Echo Color Flow Doppler? Yes    Narrative    · Normal EF  · Pericardial effusion is resolved  · There is a pleural effusion

## 2020-01-31 NOTE — PLAN OF CARE
Updated clinicals sent to [facility] via [facility]. Aware ID awaiting sensitivities for final recs.  Will update once available.  Insurance auth has been issued for admission today.  Timing of transfer will depend on PO vs IV antibiotic and length of need.  If pt needs IV antibiotics longer than 5 days will require PICC/Midline.      Karmen Navarro RN    [phone]

## 2020-01-31 NOTE — PLAN OF CARE
Call from ID with plan for IV antibiotics, duration to be determined.  PICC line to be placed no sooner than Monday with plan for return to [facility].  Julio notified, will f/u on Monday.    Karmen Navarro RN    [phone]

## 2020-01-31 NOTE — PT/OT/SLP PROGRESS
Occupational Therapy   Treatment    Name: Dori Whatley  MRN: [id number]  Admitting Diagnosis:  Pericardial effusion  4 Days Post-Op    Recommendations:     Discharge Recommendations: nursing facility, skilled  Discharge Equipment Recommendations:  none  Barriers to discharge:  None(if return to SNF)    Assessment:   Patient pleasant and eager to participate in therapy. Improving with functional transitions and bed mobility. Patient will benefit from return to SNF for continued therapies.     Dori Whatley is a 79 y.o. female with a medical diagnosis of Pericardial effusion. Performance deficits affecting function are weakness, impaired endurance, impaired self care skills, impaired functional mobilty, gait instability, impaired balance, decreased lower extremity function, decreased upper extremity function, decreased coordination, decreased ROM, impaired skin.     Rehab Prognosis:  Good; patient would benefit from acute skilled OT services to address these deficits and reach maximum level of function.       Plan:     Patient to be seen 5 x/week to address the above listed problems via self-care/home management, therapeutic activities, therapeutic exercises  · Plan of Care Expires: 02/29/20  · Plan of Care Reviewed with: patient, sibling    Subjective     Pain/Comfort:  · Pain Rating 1: 0/10    Objective:     Communicated with: nurse prior to session.  Patient found up in chair with telemetry(chair alarm) upon OT entry to room.    General Precautions: Standard, fall   Orthopedic Precautions:N/A   Braces: N/A     Bed Mobility:    · Patient completed Scooting/Bridging with CGA to EOB; Depx2 to HOB via drawsheet  · Patient completed Supine to Sit with contact guard assistance and with side rail  · Patient completed Sit to Supine with contact guard assistance     Functional Mobility/Transfers:  · Patient completed Sit <> Stand Transfer with contact guard assistance and minimum assistance  with  rolling walker    · Patient completed Bed <> Chair Transfer using Step Transfer technique with contact guard assistance with rolling walker    Activities of Daily Living:  · Upper Body Dressing: contact guard assistance to miriam gown as robe  · Toileting: PCT present for diaper change      [facility] 6 Click ADL: 17    Treatment & Education:  Patient in b/s chair and c/o sacral pain from sitting in chair for hours. Patient also stated she needed her diaper to be changed. Patient able to stand and t/f to EOB as above. Returned supine and PCT completed diaper change. BSC brought to patient's room and educated on use/safety. Patient requesting to ambulate - bed mob as above to return to sitting EOB. Patient ambulated in hallway using RW /c [place] and multiple rest breaks. Patient returned to sitting EOB and c/o R arm pain (shoulder).     Patient left HOB elevated with all lines intact, call button in reach, bed alarm on, nurse notified and sister presentEducation:      GOALS:   Multidisciplinary Problems     Occupational Therapy Goals        Problem: Occupational Therapy Goal    Goal Priority Disciplines Outcome Interventions   Occupational Therapy Goal     OT, PT/OT Ongoing, Progressing    Description:  Goals to be met by: 2/29/20     Patient will increase functional independence with ADLs by performing:    LE Dressing with Supervision.  Grooming while standing with Supervision.  Toileting from toilet with Supervision for hygiene and clothing management.   Supine to sit with Supervision.  Step transfer with Supervision  Toilet transfer to toilet with Supervision.  Upper extremity exercise program x10 reps per handout, with independence.                      Time Tracking:     OT Date of Treatment: 01/31/20  OT Start Time: 1344  OT Stop Time: 1428  OT Total Time (min): 44 min    Billable Minutes:Self Care/Home Management 15  Therapeutic Activity 29    ELSA Willard  1/31/2020

## 2020-01-31 NOTE — HPI
1/30/20 Asked to see patient for positive culture from pericardiocentesis. Note: details of history are derived from other notes in this chart - have not yet been able to review records from previous hospitalization     80 y/o woman with PMH as below admitted Jan 8 with pneumonia (LLL) treated with rocephin followed by cefdinir,(to finish 14 day course).  This admission was complicated by REGINO (creat peak 3.67), shock liver (AST peak 4000, ALT peak 3600), and thrombocytopenia - all resolved by discharge.  NO positive cultures,  Negative viral respiratory panel. Negative testsfor bordatella, chlamydia and mycoplasma.  Blood cultures x 2 negative     She  was discharged to a step-down unit.,  Unknown if patient was in a facility prior to hospitalization.  She then developed respiratory symptoms, was sent to a different hospital where evaluation showed pleural and pericardial effusions, and she was transferred here to [facility] on 1/24/2020.       My understanding is that her antibiotics stopped about Jan 22, 2020.  At this facility she had drainage of the pericardial space - has been afebrile with normal labs (WBC, chemistries)     Has right non displaced humeral fracture from fall Nov 2019.     NOTE:  Had breast cancer about 30 yrs ago, with mastectomy right breast                Three back surgeries with hardware in lumbar spine  1/30 - BC pending  1/31 Discussed case with patient and family member and Cardiology team - await staph hominis sensitivities - sensitive to oxacillin sop cefazolin begun  2/1 patient tolerating cefazolin  2/2 no complaints

## 2020-01-31 NOTE — PT/OT/SLP PROGRESS
Physical Therapy Treatment    Patient Name:  Dori Whatley   MRN:  [id number]    Recommendations:     Discharge Recommendations:  nursing facility, skilled   Discharge Equipment Recommendations: none   Barriers to discharge: Decreased caregiver support and decreased mobility strength and endurance    Assessment:     Dori Whatley is a 79 y.o. female admitted with a medical diagnosis of Pericardial effusion.  She presents with the following impairments/functional limitations:  weakness, impaired endurance, impaired functional mobilty, gait instability, impaired balance, decreased lower extremity function, decreased ROM, impaired coordination, impaired skin,pt with good participation and improved status,pt requires assistance with all mobility and remains with decreased strength and endurance,pt lives alone and will benefit from SNF upon discharge.    Rehab Prognosis: Good; patient would benefit from acute skilled PT services to address these deficits and reach maximum level of function.    Recent Surgery: Procedure(s) (LRB):  Pericardiocentesis (N/A) 4 Days Post-Op    Plan:     During this hospitalization, patient to be seen 5 x/week to address the identified rehab impairments via therapeutic activities, gait training, therapeutic exercises and progress toward the following goals:    · Plan of Care Expires:  02/29/20    Subjective     Chief Complaint: n/a  Patient/Family Comments/goals: pt agreeable to up in chair.  Pain/Comfort:  · Pain Rating 1: (tender)  · Location - Side 1: Right  · Location - Orientation 1: generalized  · Location 1: shoulder  · Pain Addressed 1: Reposition, Distraction      Objective:     Communicated with nsg prior to session.  Patient found supine with bed alarm, PureWick, telemetry upon PT entry to room.     General Precautions: Standard, fall   Orthopedic Precautions:N/A   Braces: N/A     Functional Mobility:  · Bed Mobility:     · Supine to Sit: minimum assistance  · Transfers:      · Sit to Stand:  contact guard assistance and minimum assistance with rolling walker  · Bed to Chair: minimum assistance with  rolling walker  using  ambulation  · Gait: amb ~50' X 2 trials with RW and Min A  · Balance: fair standing balance with RW      AM-PAC 6 CLICK MOBILITY  Turning over in bed (including adjusting bedclothes, sheets and blankets)?: 3  Sitting down on and standing up from a chair with arms (e.g., wheelchair, bedside commode, etc.): 3  Moving from lying on back to sitting on the side of the bed?: 3  Moving to and from a bed to a chair (including a wheelchair)?: 3  Need to walk in hospital room?: 3  Climbing 3-5 steps with a railing?: 2  Basic Mobility Total Score: 17       Therapeutic Activities and Exercises: le seated ex's x 15 reps inc: ap,laq,hip flex,abd/add.       Patient left up in chair with all lines intact, call button in reach, chair alarm on and sister present..    GOALS: see general POC  Multidisciplinary Problems     Physical Therapy Goals        Problem: Physical Therapy Goal    Goal Priority Disciplines Outcome Goal Variances Interventions   Physical Therapy Goal     PT, PT/OT Ongoing, Progressing     Description:  Goals to be met by: 2020     Patient will increase functional independence with mobility by performin. Supine to sit with Modified [place]  2. Sit to stand transfer with Modified [place]  3. Bed to chair transfer with Modified [place] using Rolling Walker  4. Gait  x 150 feet with Modified [place] using Rolling Walker.                       Time Tracking:     PT Received On: 20  PT Start Time: 1033     PT Stop Time: 1100  PT Total Time (min): 27 min     Billable Minutes: Gait Training 17 and Therapeutic Exercise 10    Treatment Type: Treatment  PT/PTA: PTA     PTA Visit Number: 2     Cedrick Mendoza, PTA  2020

## 2020-02-01 LAB
ANION GAP SERPL CALC-SCNC: 6 MMOL/L (ref 8–16)
BASOPHILS # BLD AUTO: 0.01 K/UL (ref 0–0.2)
BASOPHILS NFR BLD: 0.3 % (ref 0–1.9)
BUN SERPL-MCNC: 8 MG/DL (ref 8–23)
CALCIUM SERPL-MCNC: 8.3 MG/DL (ref 8.7–10.5)
CHLORIDE SERPL-SCNC: 106 MMOL/L (ref 95–110)
CO2 SERPL-SCNC: 30 MMOL/L (ref 23–29)
CREAT SERPL-MCNC: 0.7 MG/DL (ref 0.5–1.4)
DIFFERENTIAL METHOD: ABNORMAL
EOSINOPHIL # BLD AUTO: 0.1 K/UL (ref 0–0.5)
EOSINOPHIL NFR BLD: 2.3 % (ref 0–8)
ERYTHROCYTE [DISTWIDTH] IN BLOOD BY AUTOMATED COUNT: 14.7 % (ref 11.5–14.5)
EST. GFR  (AFRICAN AMERICAN): >60 ML/MIN/1.73 M^2
EST. GFR  (NON AFRICAN AMERICAN): >60 ML/MIN/1.73 M^2
GLUCOSE SERPL-MCNC: 112 MG/DL (ref 70–110)
HCT VFR BLD AUTO: 35.7 % (ref 37–48.5)
HGB BLD-MCNC: 11 G/DL (ref 12–16)
LYMPHOCYTES # BLD AUTO: 1.2 K/UL (ref 1–4.8)
LYMPHOCYTES NFR BLD: 33.5 % (ref 18–48)
MCH RBC QN AUTO: 28 PG (ref 27–31)
MCHC RBC AUTO-ENTMCNC: 30.8 G/DL (ref 32–36)
MCV RBC AUTO: 91 FL (ref 82–98)
MONOCYTES # BLD AUTO: 0.5 K/UL (ref 0.3–1)
MONOCYTES NFR BLD: 12.7 % (ref 4–15)
NEUTROPHILS # BLD AUTO: 1.8 K/UL (ref 1.8–7.7)
NEUTROPHILS NFR BLD: 51.2 % (ref 38–73)
PLATELET # BLD AUTO: 174 K/UL (ref 150–350)
PMV BLD AUTO: 11.4 FL (ref 9.2–12.9)
POCT GLUCOSE: 118 MG/DL (ref 70–110)
POCT GLUCOSE: 141 MG/DL (ref 70–110)
POCT GLUCOSE: 163 MG/DL (ref 70–110)
POCT GLUCOSE: 204 MG/DL (ref 70–110)
POTASSIUM SERPL-SCNC: 3.3 MMOL/L (ref 3.5–5.1)
RBC # BLD AUTO: 3.93 M/UL (ref 4–5.4)
SODIUM SERPL-SCNC: 142 MMOL/L (ref 136–145)
WBC # BLD AUTO: 3.55 K/UL (ref 3.9–12.7)

## 2020-02-01 PROCEDURE — 63600175 PHARM REV CODE 636 W HCPCS: Performed by: INTERNAL MEDICINE

## 2020-02-01 PROCEDURE — 94761 N-INVAS EAR/PLS OXIMETRY MLT: CPT

## 2020-02-01 PROCEDURE — 11000001 HC ACUTE MED/SURG PRIVATE ROOM

## 2020-02-01 PROCEDURE — 85025 COMPLETE CBC W/AUTO DIFF WBC: CPT

## 2020-02-01 PROCEDURE — 36415 COLL VENOUS BLD VENIPUNCTURE: CPT

## 2020-02-01 PROCEDURE — 25000003 PHARM REV CODE 250: Performed by: NURSE PRACTITIONER

## 2020-02-01 PROCEDURE — 80048 BASIC METABOLIC PNL TOTAL CA: CPT

## 2020-02-01 RX ADMIN — PANTOPRAZOLE SODIUM 40 MG: 40 TABLET, DELAYED RELEASE ORAL at 09:02

## 2020-02-01 RX ADMIN — APIXABAN 5 MG: 5 TABLET, FILM COATED ORAL at 08:02

## 2020-02-01 RX ADMIN — CARVEDILOL 6.25 MG: 6.25 TABLET, FILM COATED ORAL at 08:02

## 2020-02-01 RX ADMIN — ACETAMINOPHEN 650 MG: 325 TABLET ORAL at 08:02

## 2020-02-01 RX ADMIN — DULOXETINE 30 MG: 30 CAPSULE, DELAYED RELEASE ORAL at 09:02

## 2020-02-01 RX ADMIN — LOSARTAN POTASSIUM 25 MG: 25 TABLET, FILM COATED ORAL at 09:02

## 2020-02-01 RX ADMIN — GABAPENTIN 300 MG: 300 CAPSULE ORAL at 02:02

## 2020-02-01 RX ADMIN — CEFAZOLIN SODIUM 2 G: 2 SOLUTION INTRAVENOUS at 09:02

## 2020-02-01 RX ADMIN — GABAPENTIN 300 MG: 300 CAPSULE ORAL at 09:02

## 2020-02-01 RX ADMIN — GABAPENTIN 300 MG: 300 CAPSULE ORAL at 08:02

## 2020-02-01 RX ADMIN — Medication 6 MG: at 08:02

## 2020-02-01 RX ADMIN — CEFAZOLIN SODIUM 2 G: 2 SOLUTION INTRAVENOUS at 05:02

## 2020-02-01 RX ADMIN — CEFAZOLIN SODIUM 2 G: 2 SOLUTION INTRAVENOUS at 12:02

## 2020-02-01 RX ADMIN — CLOPIDOGREL BISULFATE 75 MG: 75 TABLET, FILM COATED ORAL at 09:02

## 2020-02-01 RX ADMIN — CARVEDILOL 6.25 MG: 6.25 TABLET, FILM COATED ORAL at 09:02

## 2020-02-01 RX ADMIN — APIXABAN 5 MG: 5 TABLET, FILM COATED ORAL at 09:02

## 2020-02-01 NOTE — PLAN OF CARE
Pt received on RA.  SPO2  95%.  Pt in no apparent respiratory distress.  Will continue to monitor.

## 2020-02-01 NOTE — PLAN OF CARE
Received patient upon rounds at 1920H, patient seen in bed on semi-oro's position. Conscious , coherent to time, date, place, person and situation. GCS 15. With  subjective complaint of mild chest pain, midsternum, 2/10, intermittent pain fully relieved with tylenol PO. With saline lock left AC leaking, re-sited to  left hand gauge 22 with clean and dry dressing. With left anterior chest wall pacemaker. 94% on room air. Advised patient to call for any assistance. Call bell within reach. Bed alarm On. Pure wick applied by PCT Thuy.Safety fall precaution maintained. Blood sugar monitored.Telemetry pacing 60's.Will continue to monitor patient.  2030H- Patient transferred from  room 466 to  room 464.   0558H- Patient stable VS over the night, afebrile. No untoward signs and symptoms noted over the night.Telemetry no ectopy noted over the night. Free from fall. IV line patent.Will endorse patient to day shift Nurse.

## 2020-02-01 NOTE — SUBJECTIVE & OBJECTIVE
Interval History: Started cefazolin last night for positive pericardial culture with coag negative staph and elevated WBC in pericardial fluid.     Review of Systems   Respiratory: Negative for shortness of breath.    Gastrointestinal: Negative for diarrhea, nausea and vomiting.     Antibiotics (From admission, onward)    Start     Stop Route Frequency Ordered    01/31/20 1730  cefazolin (ANCEF) 2 gram in dextrose 5% 50 mL IVPB (premix)      -- IV Every 8 hours (non-standard times) 01/31/20 1628        Objective:     Vital Signs (Most Recent):  Temp: 98.8 °F (37.1 °C) (02/01/20 1104)  Pulse: 70 (02/01/20 1200)  Resp: 18 (02/01/20 1104)  BP: (!) 140/63 (02/01/20 1104)  SpO2: 95 % (02/01/20 1151) Vital Signs (24h Range):  Temp:  [97.1 °F (36.2 °C)-99.4 °F (37.4 °C)] 98.8 °F (37.1 °C)  Pulse:  [60-76] 70  Resp:  [16-20] 18  SpO2:  [92 %-95 %] 95 %  BP: (131-165)/(63-79) 140/63     Weight: 63.1 kg (139 lb 1.8 oz)  Body mass index is 21.79 kg/m².    Estimated Creatinine Clearance: 63.4 mL/min (based on SCr of 0.7 mg/dL).    Physical Exam   Cardiovascular: Normal heart sounds.   Pulmonary/Chest: Breath sounds normal.   Abdominal: Bowel sounds are normal. She exhibits no distension. There is no tenderness.   Musculoskeletal: She exhibits no edema.   Back - has well healed scars from spine surgery up and down spine - thoracic to lumbar- with prominence of the hardware but no open skin at hardware site   Neurological: She is alert.       Significant Labs:   Blood Culture:   Recent Labs   Lab 01/31/20  0926   LABBLOO No Growth to date  No Growth to date     CBC:   Recent Labs   Lab 01/31/20  0602 02/01/20  0535   WBC 4.32 3.55*   HGB 11.3* 11.0*   HCT 36.6* 35.7*    174     CMP:   Recent Labs   Lab 01/31/20  0602 02/01/20  0536    142   K 3.7 3.3*    106   CO2 29 30*   * 112*   BUN 6* 8   CREATININE 0.6 0.7   CALCIUM 8.9 8.3*   ANIONGAP 7* 6*   EGFRNONAA >60 >60     Urine Studies:   Recent Labs    Lab 01/26/20  1605   COLORU Yellow   APPEARANCEUA Clear   PHUR 7.0   SPECGRAV 1.010   PROTEINUA Negative   GLUCUA Negative   KETONESU Negative   BILIRUBINUA Negative   OCCULTUA Negative   NITRITE Negative   UROBILINOGEN 1.0   LEUKOCYTESUR Negative     1/27 pericardial fluid   Staphylococcus hominis subsp. hominis       CULTURE, FLUID  (AEROBIC) WITH GRAM STAIN     Clindamycin <=0.5 mcg/mL Sensitive     Erythromycin <=0.5 mcg/mL Sensitive     Oxacillin <=0.25 mcg/mL Sensitive     Penicillin 2 mcg/mL Resistant     Tetracycline <=4 mcg/mL Sensitive     Trimeth/Sulfa <=0.5/9.5 m... Sensitive            Significant Imaging: no new imaging since 1/28

## 2020-02-01 NOTE — PROGRESS NOTES
[facility]  Infectious Disease  Progress Note    Patient Name: Dori Whatley  MRN: [id number]  Admission Date: 1/24/2020  Length of Stay: 8 days  Attending Physician: Vadim Good MD  Primary Care Provider: Tru Sanchez MD    Isolation Status: No active isolations  Assessment/Plan:      * Pericardial effusion  78 y/o with pericardial effusion s/p tap on 1/27/2020 with WBC count of 1295 (26% segs, 67% lymphs), cytology negative for malignant cells; had recent pneumonia and pleural effusions; has history of back surgeries with hardware in place and pacemaker in place. Pericardial fluid - the tap was easy and it was bloody and grew staph hominis sensitive to oxacillin. BC pending. Cefazolin started 1/31/20.     Rec:  Continue cefazolin  Watch BC from 1/31  Will check on duration of treatment from literature - so far 2 - 4 weeks has been recommended  Check sed rate and crp tomorrow and lfts  Will need ID follow up - please try to schedule [facility]             Anticipated Disposition: unclear duration of antibiotics - will check literature    Thank you for your consult. I will follow-up with patient. Please contact us if you have any additional questions.[phone]    Mackenzie Lama MD  Infectious Disease  [facility]    Subjective:     Principal Problem:Pericardial effusion    HPI: 1/30/20 Asked to see patient for positive culture from pericardiocentesis. Note: details of history are derived from other notes in this chart - have not yet been able to review records from previous hospitalization     78 y/o woman with PMH as below admitted Jan 8 with pneumonia (LLL) treated with rocephin followed by cefdinir,(to finish 14 day course).  This admission was complicated by REGINO (creat peak 3.67), shock liver (AST peak 4000, ALT peak 3600), and thrombocytopenia - all resolved by discharge.  NO positive cultures,  Negative viral respiratory panel. Negative testsfor  bordatella, chlamydia and mycoplasma.  Blood cultures x 2 negative     She  was discharged to a step-down unit.,  Unknown if patient was in a facility prior to hospitalization.  She then developed respiratory symptoms, was sent to a different hospital where evaluation showed pleural and pericardial effusions, and she was transferred here to [facility] on 1/24/2020.       My understanding is that her antibiotics stopped about Jan 22, 2020.  At this facility she had drainage of the pericardial space - has been afebrile with normal labs (WBC, chemistries)     Has right non displaced humeral fracture from fall Nov 2019.     NOTE:  Had breast cancer about 30 yrs ago, with mastectomy right breast                Three back surgeries with hardware in lumbar spine  1/30 - BC pending  1/31 Discussed case with patient and family member and Cardiology team - await staph hominis sensitivities - sensitive to oxacillin sop cefazolin begun  2/1 patient tolerating cefazolin  Interval History: Started cefazolin last night for positive pericardial culture with coag negative staph and elevated WBC in pericardial fluid.     Review of Systems   Respiratory: Negative for shortness of breath.    Gastrointestinal: Negative for diarrhea, nausea and vomiting.     Antibiotics (From admission, onward)    Start     Stop Route Frequency Ordered    01/31/20 1730  cefazolin (ANCEF) 2 gram in dextrose 5% 50 mL IVPB (premix)      -- IV Every 8 hours (non-standard times) 01/31/20 1628        Objective:     Vital Signs (Most Recent):  Temp: 98.8 °F (37.1 °C) (02/01/20 1104)  Pulse: 70 (02/01/20 1200)  Resp: 18 (02/01/20 1104)  BP: (!) 140/63 (02/01/20 1104)  SpO2: 95 % (02/01/20 1151) Vital Signs (24h Range):  Temp:  [97.1 °F (36.2 °C)-99.4 °F (37.4 °C)] 98.8 °F (37.1 °C)  Pulse:  [60-76] 70  Resp:  [16-20] 18  SpO2:  [92 %-95 %] 95 %  BP: (131-165)/(63-79) 140/63     Weight: 63.1 kg (139 lb 1.8 oz)  Body mass index is 21.79 kg/m².    Estimated Creatinine  Clearance: 63.4 mL/min (based on SCr of 0.7 mg/dL).    Physical Exam   Cardiovascular: Normal heart sounds.   Pulmonary/Chest: Breath sounds normal.   Abdominal: Bowel sounds are normal. She exhibits no distension. There is no tenderness.   Musculoskeletal: She exhibits no edema.   Back - has well healed scars from spine surgery up and down spine - thoracic to lumbar- with prominence of the hardware but no open skin at hardware site   Neurological: She is alert.       Significant Labs:   Blood Culture:   Recent Labs   Lab 01/31/20  0926   LABBLOO No Growth to date  No Growth to date     CBC:   Recent Labs   Lab 01/31/20  0602 02/01/20  0535   WBC 4.32 3.55*   HGB 11.3* 11.0*   HCT 36.6* 35.7*    174     CMP:   Recent Labs   Lab 01/31/20  0602 02/01/20  0536    142   K 3.7 3.3*    106   CO2 29 30*   * 112*   BUN 6* 8   CREATININE 0.6 0.7   CALCIUM 8.9 8.3*   ANIONGAP 7* 6*   EGFRNONAA >60 >60     Urine Studies:   Recent Labs   Lab 01/26/20  1605   COLORU Yellow   APPEARANCEUA Clear   PHUR 7.0   SPECGRAV 1.010   PROTEINUA Negative   GLUCUA Negative   KETONESU Negative   BILIRUBINUA Negative   OCCULTUA Negative   NITRITE Negative   UROBILINOGEN 1.0   LEUKOCYTESUR Negative     1/27 pericardial fluid   Staphylococcus hominis subsp. hominis       CULTURE, FLUID  (AEROBIC) WITH GRAM STAIN     Clindamycin <=0.5 mcg/mL Sensitive     Erythromycin <=0.5 mcg/mL Sensitive     Oxacillin <=0.25 mcg/mL Sensitive     Penicillin 2 mcg/mL Resistant     Tetracycline <=4 mcg/mL Sensitive     Trimeth/Sulfa <=0.5/9.5 m... Sensitive            Significant Imaging: no new imaging since 1/28

## 2020-02-01 NOTE — PLAN OF CARE
Plan of care reviewed with patient. Verbal reported of understanding. Paced rhythm on tele monitor with no red alarms noted. No complaint of pain or other discomfort throughout the shift. Safety maintained. No falls throughout the shift. Dude meds given per ordered. Blood glucose closely monitored. No acute distress noted at this time. Bed in lowest position. Bed alarm on. Head of bed elevated. Side rails x 2 are up. Call bell within reach. Patient will be monitored overnight.

## 2020-02-01 NOTE — PLAN OF CARE
VN Rounds. VN called into patient's room for rounding and turned camera with permission. Patient resting in bed. She is excited to hopefully be going home or to LTAC on Monday. She states she is feeling much better and can't believe how sick she was. VN instructed to call for assistance. Call light within reach. Patient verbalized understanding. No acute distress noted. Pain denies pain at present. Allowed time for questions. Will continue to monitor chart and be available and intervene as needed.

## 2020-02-01 NOTE — ASSESSMENT & PLAN NOTE
78 y/o with pericardial effusion s/p tap on 1/27/2020 with WBC count of 1295 (26% segs, 67% lymphs), cytology negative for malignant cells; had recent pneumonia and pleural effusions; has history of back surgeries with hardware in place and pacemaker in place. Pericardial fluid - the tap was easy and it was bloody and grew staph hominis sensitive to oxacillin. BC pending. Cefazolin started 1/31/20.     Rec:  Continue cefazolin  Watch BC from 1/31  Will check on duration of treatment from literature - so far 2 - 4 weeks has been recommended  Check sed rate and crp tomorrow and lfts  Will need ID follow up - please try to schedule [facility]

## 2020-02-02 LAB
ALBUMIN SERPL BCP-MCNC: 2.7 G/DL (ref 3.5–5.2)
ALP SERPL-CCNC: 74 U/L (ref 55–135)
ALT SERPL W/O P-5'-P-CCNC: 9 U/L (ref 10–44)
ANION GAP SERPL CALC-SCNC: 10 MMOL/L (ref 8–16)
AST SERPL-CCNC: 27 U/L (ref 10–40)
BASOPHILS # BLD AUTO: 0.01 K/UL (ref 0–0.2)
BASOPHILS NFR BLD: 0.2 % (ref 0–1.9)
BILIRUB DIRECT SERPL-MCNC: 0.3 MG/DL (ref 0.1–0.3)
BILIRUB SERPL-MCNC: 0.5 MG/DL (ref 0.1–1)
BUN SERPL-MCNC: 8 MG/DL (ref 8–23)
CALCIUM SERPL-MCNC: 8.7 MG/DL (ref 8.7–10.5)
CHLORIDE SERPL-SCNC: 108 MMOL/L (ref 95–110)
CO2 SERPL-SCNC: 26 MMOL/L (ref 23–29)
CREAT SERPL-MCNC: 0.7 MG/DL (ref 0.5–1.4)
CRP SERPL-MCNC: 4.4 MG/L (ref 0–8.2)
DIFFERENTIAL METHOD: ABNORMAL
EOSINOPHIL # BLD AUTO: 0.1 K/UL (ref 0–0.5)
EOSINOPHIL NFR BLD: 1.8 % (ref 0–8)
ERYTHROCYTE [DISTWIDTH] IN BLOOD BY AUTOMATED COUNT: 14.7 % (ref 11.5–14.5)
ERYTHROCYTE [SEDIMENTATION RATE] IN BLOOD BY WESTERGREN METHOD: 10 MM/HR (ref 0–20)
EST. GFR  (AFRICAN AMERICAN): >60 ML/MIN/1.73 M^2
EST. GFR  (NON AFRICAN AMERICAN): >60 ML/MIN/1.73 M^2
GLUCOSE SERPL-MCNC: 126 MG/DL (ref 70–110)
HCT VFR BLD AUTO: 36.4 % (ref 37–48.5)
HGB BLD-MCNC: 11.2 G/DL (ref 12–16)
LYMPHOCYTES # BLD AUTO: 1.3 K/UL (ref 1–4.8)
LYMPHOCYTES NFR BLD: 30.9 % (ref 18–48)
MCH RBC QN AUTO: 28.1 PG (ref 27–31)
MCHC RBC AUTO-ENTMCNC: 30.8 G/DL (ref 32–36)
MCV RBC AUTO: 92 FL (ref 82–98)
MONOCYTES # BLD AUTO: 0.4 K/UL (ref 0.3–1)
MONOCYTES NFR BLD: 9 % (ref 4–15)
NEUTROPHILS # BLD AUTO: 2.5 K/UL (ref 1.8–7.7)
NEUTROPHILS NFR BLD: 58.1 % (ref 38–73)
PLATELET # BLD AUTO: 183 K/UL (ref 150–350)
PMV BLD AUTO: 11.5 FL (ref 9.2–12.9)
POCT GLUCOSE: 115 MG/DL (ref 70–110)
POCT GLUCOSE: 133 MG/DL (ref 70–110)
POCT GLUCOSE: 133 MG/DL (ref 70–110)
POCT GLUCOSE: 186 MG/DL (ref 70–110)
POTASSIUM SERPL-SCNC: 4.1 MMOL/L (ref 3.5–5.1)
PROT SERPL-MCNC: 6 G/DL (ref 6–8.4)
RBC # BLD AUTO: 3.98 M/UL (ref 4–5.4)
SODIUM SERPL-SCNC: 144 MMOL/L (ref 136–145)
WBC # BLD AUTO: 4.34 K/UL (ref 3.9–12.7)

## 2020-02-02 PROCEDURE — 97110 THERAPEUTIC EXERCISES: CPT | Mod: CQ

## 2020-02-02 PROCEDURE — 94761 N-INVAS EAR/PLS OXIMETRY MLT: CPT

## 2020-02-02 PROCEDURE — 63600175 PHARM REV CODE 636 W HCPCS: Performed by: INTERNAL MEDICINE

## 2020-02-02 PROCEDURE — 85025 COMPLETE CBC W/AUTO DIFF WBC: CPT

## 2020-02-02 PROCEDURE — 36415 COLL VENOUS BLD VENIPUNCTURE: CPT

## 2020-02-02 PROCEDURE — 25000003 PHARM REV CODE 250: Performed by: NURSE PRACTITIONER

## 2020-02-02 PROCEDURE — 85652 RBC SED RATE AUTOMATED: CPT

## 2020-02-02 PROCEDURE — 97116 GAIT TRAINING THERAPY: CPT | Mod: CQ

## 2020-02-02 PROCEDURE — 80048 BASIC METABOLIC PNL TOTAL CA: CPT

## 2020-02-02 PROCEDURE — 86140 C-REACTIVE PROTEIN: CPT

## 2020-02-02 PROCEDURE — 11000001 HC ACUTE MED/SURG PRIVATE ROOM

## 2020-02-02 PROCEDURE — 80076 HEPATIC FUNCTION PANEL: CPT

## 2020-02-02 RX ADMIN — APIXABAN 5 MG: 5 TABLET, FILM COATED ORAL at 08:02

## 2020-02-02 RX ADMIN — CLOPIDOGREL BISULFATE 75 MG: 75 TABLET, FILM COATED ORAL at 09:02

## 2020-02-02 RX ADMIN — DULOXETINE 30 MG: 30 CAPSULE, DELAYED RELEASE ORAL at 09:02

## 2020-02-02 RX ADMIN — CEFAZOLIN SODIUM 2 G: 2 SOLUTION INTRAVENOUS at 04:02

## 2020-02-02 RX ADMIN — APIXABAN 5 MG: 5 TABLET, FILM COATED ORAL at 09:02

## 2020-02-02 RX ADMIN — CARVEDILOL 6.25 MG: 6.25 TABLET, FILM COATED ORAL at 08:02

## 2020-02-02 RX ADMIN — GABAPENTIN 300 MG: 300 CAPSULE ORAL at 04:02

## 2020-02-02 RX ADMIN — CARVEDILOL 6.25 MG: 6.25 TABLET, FILM COATED ORAL at 09:02

## 2020-02-02 RX ADMIN — GABAPENTIN 300 MG: 300 CAPSULE ORAL at 09:02

## 2020-02-02 RX ADMIN — GABAPENTIN 300 MG: 300 CAPSULE ORAL at 08:02

## 2020-02-02 RX ADMIN — CEFAZOLIN SODIUM 2 G: 2 SOLUTION INTRAVENOUS at 09:02

## 2020-02-02 RX ADMIN — LOSARTAN POTASSIUM 25 MG: 25 TABLET, FILM COATED ORAL at 09:02

## 2020-02-02 RX ADMIN — PANTOPRAZOLE SODIUM 40 MG: 40 TABLET, DELAYED RELEASE ORAL at 09:02

## 2020-02-02 RX ADMIN — CEFAZOLIN SODIUM 2 G: 2 SOLUTION INTRAVENOUS at 02:02

## 2020-02-02 NOTE — ASSESSMENT & PLAN NOTE
78 y/o with pericardial effusion s/p tap on 1/27/2020 with WBC count of 1295 (26% segs, 67% lymphs), cytology negative for malignant cells; had recent pneumonia and pleural effusions; has history of back surgeries with hardware in place and pacemaker in place. Pericardial fluid - the tap was easy and it was bloody and grew staph hominis sensitive to oxacillin. BC pending. Cefazolin started 1/31/20. Sed rate 2/2=10, CRP =4.4    Rec:  Continue cefazolin for 4 weeks - stop date 2/28/2020  Watch BC from 1/31  Will need ID follow up - please try to schedule [facility] and let me know if that is not posisble  Need to get cbc and cmp weekly while on antibiotics   Please fax home health labs to [facility] [place] until clinic appointment with [facility].

## 2020-02-02 NOTE — SUBJECTIVE & OBJECTIVE
Interval History: Patient tolerating cefazolin with no complaints.     Review of Systems   Respiratory: Negative for shortness of breath.    Gastrointestinal: Negative for diarrhea, nausea and vomiting.     Antibiotics (From admission, onward)    Start     Stop Route Frequency Ordered    01/31/20 1730  cefazolin (ANCEF) 2 gram in dextrose 5% 50 mL IVPB (premix)      -- IV Every 8 hours (non-standard times) 01/31/20 1628        Objective:     Vital Signs (Most Recent):  Temp: 98.8 °F (37.1 °C) (02/02/20 1533)  Pulse: 78 (02/02/20 1600)  Resp: 18 (02/02/20 1533)  BP: (!) 115/56 (02/02/20 1533)  SpO2: 96 % (02/02/20 1554) Vital Signs (24h Range):  Temp:  [98.2 °F (36.8 °C)-98.8 °F (37.1 °C)] 98.8 °F (37.1 °C)  Pulse:  [] 78  Resp:  [18] 18  SpO2:  [94 %-96 %] 96 %  BP: (115-168)/(56-87) 115/56     Weight: 63.1 kg (139 lb 1.8 oz)  Body mass index is 21.79 kg/m².    Estimated Creatinine Clearance: 63.4 mL/min (based on SCr of 0.7 mg/dL).    Physical Exam   Cardiovascular: Normal heart sounds.   Pulmonary/Chest: Breath sounds normal.   Abdominal: Bowel sounds are normal.   Musculoskeletal: She exhibits no edema.   Neurological: She is alert.       Significant Labs:   Blood Culture:   Recent Labs   Lab 01/31/20  0926   LABBLOO No Growth to date  No Growth to date  No Growth to date  No Growth to date  No Growth to date  No Growth to date     CBC:   Recent Labs   Lab 02/01/20  0535 02/02/20  1341   WBC 3.55* 4.34   HGB 11.0* 11.2*   HCT 35.7* 36.4*    183     CMP:   Recent Labs   Lab 02/01/20  0536 02/02/20  0836    144   K 3.3* 4.1    108   CO2 30* 26   * 126*   BUN 8 8   CREATININE 0.7 0.7   CALCIUM 8.3* 8.7   PROT  --  6.0   ALBUMIN  --  2.7*   BILITOT  --  0.5   ALKPHOS  --  74   AST  --  27   ALT  --  9*   ANIONGAP 6* 10   EGFRNONAA >60 >60     Urine Studies:   Recent Labs   Lab 01/26/20  1605   COLORU Yellow   APPEARANCEUA Clear   PHUR 7.0   SPECGRAV 1.010   PROTEINUA Negative    GLUCUA Negative   KETONESU Negative   BILIRUBINUA Negative   OCCULTUA Negative   NITRITE Negative   UROBILINOGEN 1.0   LEUKOCYTESUR Negative       Significant Imaging: no new images since 1/28

## 2020-02-02 NOTE — PROGRESS NOTES
[facility]  Cardiology  Progress Note    Patient Name: Dori Whatley  MRN: [id number]  Admission Date: 1/24/2020  Hospital Length of Stay: 9 days  Code Status: Full Code   Attending Physician: Vadim Good MD   Primary Care Physician: Tru Sanchez MD  Expected Discharge Date:   Principal Problem:Pericardial effusion    Subjective:     Hospital Course:   1/25/2020 Transferred from [place] with pericardial effusion in need for pericardiocentesis. Echo at [facility] with large pericardial effusion with early tamponade physiology/RA collapse. BP stable and Eliquis held.   1/26/2020 Patient is doing well, Had episode of mild resp distress required NC o2. Now of oxygen and doing well.   1/27/2020 NPO for pericardiocentesis today  1/28/2020 Pericardiocentesis yesterday with approximately 920cc removed with fluid sent to lab for analysis. Admitted to ICU with pericardial drain in place. No output overnight per bedside RN with notation of 5-10cc this morning so far. Patient reports breathing improved and able to lie flat overnight. HR and BP stable overnight. Decrease of urine output noted yesterday with NS at 75cc/hr ordered with 750cc out overnight -2.8 liters since admission. Will plan for repeat limited echo today with pericardial drain removal once output decreased/non existent   1/29/2020 Repeat echo yesterday with resolution of pericardial effusion and removal of drain. IR consulted with minimal/trace pleural effusions therefore no repeat thoracentesis needed. Transferred to floor. HR and BP stable. PT and OT on board. Planning to discharge to rehab in [place] once approval obtained   1/30/2020 HR stable overnight. BP 140s-160s/60s overnight. CBC and BMP stable. Complaints of SOB this morning relieved with sitting up and drinking coffee. Culture with staphylococcus hominin on prelim report-will consult ID   01/31/2020 Hemodynamically stable, afebrile. No further SOB. Blood cultures  obtained per ID recs. Patient ambulating in carr with assistance.   2/1: Pt seen and examined. No overnight events. Started on cefazolin. Doing well. No temp    Review of Systems   Constitution: Negative for chills, decreased appetite, malaise/fatigue and weight gain.   HENT: Negative for congestion and ear discharge.    Eyes: Negative for blurred vision and double vision.   Cardiovascular: Positive for chest pain. Negative for cyanosis, dyspnea on exertion, irregular heartbeat, leg swelling, near-syncope, orthopnea, palpitations and syncope.   Respiratory: Negative for cough, shortness of breath and sleep disturbances due to breathing.    Skin: Negative for color change and dry skin.   Musculoskeletal: Negative for joint pain, joint swelling and muscle cramps.   Gastrointestinal: Negative for bloating, heartburn, hematemesis and hematochezia.   Genitourinary: Negative for bladder incontinence and dysuria.   Neurological: Negative for aphonia, excessive daytime sleepiness, dizziness, focal weakness, headaches, light-headedness, loss of balance and weakness.   Psychiatric/Behavioral: Negative for altered mental status, depression and memory loss. The patient does not have insomnia and is not nervous/anxious.      Objective:     Vital Signs (Most Recent):  Temp: 98.8 °F (37.1 °C) (02/02/20 1533)  Pulse: 78 (02/02/20 1600)  Resp: 18 (02/02/20 1533)  BP: (!) 115/56 (02/02/20 1533)  SpO2: 96 % (02/02/20 1554) Vital Signs (24h Range):  Temp:  [98.2 °F (36.8 °C)-98.8 °F (37.1 °C)] 98.8 °F (37.1 °C)  Pulse:  [] 78  Resp:  [18] 18  SpO2:  [94 %-96 %] 96 %  BP: (115-168)/(56-87) 115/56     Weight: 63.1 kg (139 lb 1.8 oz)  Body mass index is 21.79 kg/m².    SpO2: 96 %  O2 Device (Oxygen Therapy): room air      Intake/Output Summary (Last 24 hours) at 2/2/2020 1715  Last data filed at 2/2/2020 0600  Gross per 24 hour   Intake 250 ml   Output --   Net 250 ml       Physical Exam   Constitutional: She is oriented to person,  place, and time. She appears well-developed and well-nourished.   HENT:   Head: Normocephalic and atraumatic.   Eyes: Conjunctivae and EOM are normal.   Neck: Normal range of motion. Neck supple. No JVD present.   Cardiovascular: Normal rate, regular rhythm and normal heart sounds.   No murmur heard.  Pulmonary/Chest: Effort normal and breath sounds normal. No respiratory distress. She has no wheezes. She has no rales.   Abdominal: Soft. Bowel sounds are normal. She exhibits no distension.   Musculoskeletal: Normal range of motion. She exhibits no edema.   Neurological: She is alert and oriented to person, place, and time.   Skin: Skin is warm and dry. No erythema.   Psychiatric: She has a normal mood and affect. Her behavior is normal. Judgment and thought content normal.   Nursing note and vitals reviewed.      Significant Labs:   BMP:   Recent Labs   Lab 02/01/20  0536 02/02/20  0836   * 126*    144   K 3.3* 4.1    108   CO2 30* 26   BUN 8 8   CREATININE 0.7 0.7   CALCIUM 8.3* 8.7   , CMP   Recent Labs   Lab 02/01/20  0536 02/02/20  0836    144   K 3.3* 4.1    108   CO2 30* 26   * 126*   BUN 8 8   CREATININE 0.7 0.7   CALCIUM 8.3* 8.7   PROT  --  6.0   ALBUMIN  --  2.7*   BILITOT  --  0.5   ALKPHOS  --  74   AST  --  27   ALT  --  9*   ANIONGAP 6* 10   ESTGFRAFRICA >60 >60   EGFRNONAA >60 >60   , CBC   Recent Labs   Lab 02/01/20  0535 02/02/20  1341   WBC 3.55* 4.34   HGB 11.0* 11.2*   HCT 35.7* 36.4*    183    and Troponin No results for input(s): TROPONINI in the last 48 hours.    Significant Imaging: Echocardiogram:   2D echo with color flow doppler: No results found for this or any previous visit. and Transthoracic echo (TTE) complete (Cupid Only):   Results for orders placed or performed during the hospital encounter of 01/24/20   Echo Color Flow Doppler? Yes    Narrative    · Normal EF  · Pericardial effusion is resolved  · There is a pleural effusion          apixaban  5 mg Oral BID    carvediloL  6.25 mg Oral BID    ceFAZolin (ANCEF) IVPB  2 g Intravenous Q8H    clopidogreL  75 mg Oral Daily    DULoxetine  30 mg Oral Daily    gabapentin  300 mg Oral TID    losartan  25 mg Oral Daily    pantoprazole  40 mg Oral Daily       Assessment and Plan:       Active Diagnoses:    Diagnosis Date Noted POA    PRINCIPAL PROBLEM:  Pericardial effusion [I31.3] 01/24/2020 Yes    Staph infection [B95.8]  Unknown    Physical debility [R53.81] 01/29/2020 Yes    Paroxysmal atrial fibrillation [I48.0] 01/25/2020 Yes    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 Cardiac tamponade with pericardial effusion  - s/p 1 L pericardicentesis on 1/27  Now thought to be infectious e/o  Started cefazolin last night for positive pericardial culture with coag negative staph   will continue for 4 weeks - 2/28  - will schedule home health labs and ID follow up Monday.    Will follow blood cultures: NGTD x 3 days    Doing well post-op, no acute changes    Hopefully dispo planning tomorrow on  VTE Risk Mitigation (From admission, onward)         Ordered     apixaban tablet 5 mg  2 times daily      01/30/20 1004     Place sequential compression device  Until discontinued      01/25/20 1210     IP VTE LOW RISK PATIENT  Once      01/24/20 1919     Place LASHANDA hose  Until discontinued      01/24/20 1919                Merrill Shipman MD  Cardiology  [facility]

## 2020-02-02 NOTE — PLAN OF CARE
Pt AAOx4 has no c/o pain. LH 22g dry, clean, intact, & patent. PT requested to use purewick only at bed time. Bed at lowest height, call light in reach, side rails up x2, bed alarm on.

## 2020-02-02 NOTE — PT/OT/SLP PROGRESS
Physical Therapy Treatment    Patient Name:  Dori Whatley   MRN:  [id number]    Recommendations:     Discharge Recommendations:  nursing facility, skilled   Discharge Equipment Recommendations: none   Barriers to discharge: Decreased caregiver support, decreased functional mobility    Assessment:     Dori Whatley is a 79 y.o. female admitted with a medical diagnosis of Pericardial effusion.  She presents with the following impairments/functional limitations:  weakness, impaired endurance, impaired self care skills, impaired functional mobilty, gait instability, impaired balance, decreased coordination, decreased upper extremity function, decreased lower extremity function, decreased safety awareness, decreased ROM, impaired skin. Pt able to increase ambulation distance. Pt performed ambulation training ~150 ft with RW and min A requiring verbal cueing to stay within RW boundaries. Would benefit from continued PT services to increase pt's out of bed therapeutic activity and exercise.    Rehab Prognosis: Good; patient would benefit from acute skilled PT services to address these deficits and reach maximum level of function.    Recent Surgery: Procedure(s) (LRB):  Pericardiocentesis (N/A) 6 Days Post-Op    Plan:     During this hospitalization, patient to be seen 5 x/week to address the identified rehab impairments via gait training, therapeutic activities, therapeutic exercises and progress toward the following goals:    · Plan of Care Expires:  02/29/20    Subjective     Chief Complaint: None Expressed  Patient/Family Comments/goals: None Expressed  Pain/Comfort:  · Pain Rating 1: 0/10  · Pain Rating Post-Intervention 1: 0/10      Objective:     Communicated with nurse prior to session.  Patient found supine with peripheral IV, telemetry upon PT entry to room.     General Precautions: Standard, fall   Orthopedic Precautions:N/A   Braces: N/A     Functional Mobility:  · Bed Mobility:     · Rolling Right:  "supervision  · Scooting: supervision  · Supine to Sit: contact guard assistance and minimum assistance  · Transfers:     · Sit to Stand:  contact guard assistance and minimum assistance with rolling walker  · Gait:  ~150 ft with RW and min a. Verbal cueing needed to stay within RW boundaries toward end of distance. Demonstrates an increased trunk flexion which pt states is how she has walked for a while now.       AM-PAC 6 CLICK MOBILITY  Turning over in bed (including adjusting bedclothes, sheets and blankets)?: 3  Sitting down on and standing up from a chair with arms (e.g., wheelchair, bedside commode, etc.): 3  Moving from lying on back to sitting on the side of the bed?: 3  Moving to and from a bed to a chair (including a wheelchair)?: 3  Need to walk in hospital room?: 3  Climbing 3-5 steps with a railing?: 2  Basic Mobility Total Score: 17       Therapeutic Activities and Exercises:   Pt performed seated therapeutic exercises BLE's 1 x 10 reps consisting of LAQ's, hip add/abd, and hip/knee flexion. Also 5 sit to stand transfers for added exercise with CGA/SBA. 1st 2 reps of sit to stand required extra time to complete. Ambulation training ~150 ft with RW and Min A plus verbal cueing to stay within RW boundaries. Pt demonstrates increased trunk flexion which when attempting to correct with verbal and tactile cueing pt states, "I have walked this way for awhile now. I cannot straighten up all the way".    Patient left up in chair with all lines intact, call button in reach, chair alarm on and  nurse (made aware purewick taken off for ambualtion) notified..    GOALS:   Multidisciplinary Problems     Physical Therapy Goals        Problem: Physical Therapy Goal    Goal Priority Disciplines Outcome Goal Variances Interventions   Physical Therapy Goal     PT, PT/OT Ongoing, Progressing     Description:  Goals to be met by: 2020     Patient will increase functional independence with mobility by performin. " Supine to sit with Modified [place]  2. Sit to stand transfer with Modified [place]  3. Bed to chair transfer with Modified [place] using Rolling Walker  4. Gait  x 150 feet with Modified [place] using Rolling Walker.                       Time Tracking:     PT Received On: 02/02/20  PT Start Time: 1401     PT Stop Time: 1436  PT Total Time (min): 35 min     Billable Minutes: Gait Training  20 and Therapeutic Exercise  15    Treatment Type: Treatment  PT/PTA: PTA     PTA Visit Number: 3     Sivla Teran, PTA  02/02/2020

## 2020-02-02 NOTE — PROGRESS NOTES
[facility]  Infectious Disease  Progress Note    Patient Name: Dori Whatley  MRN: [id number]  Admission Date: 1/24/2020  Length of Stay: 9 days  Attending Physician: Vadim Good MD  Primary Care Provider: Tru Sanchez MD    Isolation Status: No active isolations  Assessment/Plan:      * Pericardial effusion  80 y/o with pericardial effusion s/p tap on 1/27/2020 with WBC count of 1295 (26% segs, 67% lymphs), cytology negative for malignant cells; had recent pneumonia and pleural effusions; has history of back surgeries with hardware in place and pacemaker in place. Pericardial fluid - the tap was easy and it was bloody and grew staph hominis sensitive to oxacillin. BC pending. Cefazolin started 1/31/20. Sed rate 2/2=10, CRP =4.4    Rec:  Continue cefazolin for 4 weeks - stop date 2/28/2020  Watch BC from 1/31  Will need ID follow up - please try to schedule [facility] and let me know if that is not posisble  Need to get cbc and cmp weekly while on antibiotics   Please fax home health labs to [facility] [place] until clinic appointment with [facility].               Anticipated Disposition: await placement decision and IV antibiotic arrangements    Thank you for your consult. I will follow-up with patient. Please contact us if you have any additional questions.[phone]    Mackenzie Lama MD  Infectious Disease  [facility]    Subjective:     Principal Problem:Pericardial effusion    HPI: 1/30/20 Asked to see patient for positive culture from pericardiocentesis. Note: details of history are derived from other notes in this chart - have not yet been able to review records from previous hospitalization     80 y/o woman with PMH as below admitted Jan 8 with pneumonia (LLL) treated with rocephin followed by cefdinir,(to finish 14 day course).  This admission was complicated by REGINO (creat peak 3.67), shock liver (AST peak 4000, ALT peak 3600), and thrombocytopenia -  all resolved by discharge.  NO positive cultures,  Negative viral respiratory panel. Negative testsfor bordatella, chlamydia and mycoplasma.  Blood cultures x 2 negative     She  was discharged to a step-down unit.,  Unknown if patient was in a facility prior to hospitalization.  She then developed respiratory symptoms, was sent to a different hospital where evaluation showed pleural and pericardial effusions, and she was transferred here to [facility] on 1/24/2020.       My understanding is that her antibiotics stopped about Jan 22, 2020.  At this facility she had drainage of the pericardial space - has been afebrile with normal labs (WBC, chemistries)     Has right non displaced humeral fracture from fall Nov 2019.     NOTE:  Had breast cancer about 30 yrs ago, with mastectomy right breast                Three back surgeries with hardware in lumbar spine  1/30 - BC pending  1/31 Discussed case with patient and family member and Cardiology team - await staph hominis sensitivities - sensitive to oxacillin sop cefazolin begun  2/1 patient tolerating cefazolin  2/2 no complaints  Interval History: Patient tolerating cefazolin with no complaints.     Review of Systems   Respiratory: Negative for shortness of breath.    Gastrointestinal: Negative for diarrhea, nausea and vomiting.     Antibiotics (From admission, onward)    Start     Stop Route Frequency Ordered    01/31/20 1730  cefazolin (ANCEF) 2 gram in dextrose 5% 50 mL IVPB (premix)      -- IV Every 8 hours (non-standard times) 01/31/20 1628        Objective:     Vital Signs (Most Recent):  Temp: 98.8 °F (37.1 °C) (02/02/20 1533)  Pulse: 78 (02/02/20 1600)  Resp: 18 (02/02/20 1533)  BP: (!) 115/56 (02/02/20 1533)  SpO2: 96 % (02/02/20 1554) Vital Signs (24h Range):  Temp:  [98.2 °F (36.8 °C)-98.8 °F (37.1 °C)] 98.8 °F (37.1 °C)  Pulse:  [] 78  Resp:  [18] 18  SpO2:  [94 %-96 %] 96 %  BP: (115-168)/(56-87) 115/56     Weight: 63.1 kg (139 lb 1.8 oz)  Body mass index  is 21.79 kg/m².    Estimated Creatinine Clearance: 63.4 mL/min (based on SCr of 0.7 mg/dL).    Physical Exam   Cardiovascular: Normal heart sounds.   Pulmonary/Chest: Breath sounds normal.   Abdominal: Bowel sounds are normal.   Musculoskeletal: She exhibits no edema.   Neurological: She is alert.       Significant Labs:   Blood Culture:   Recent Labs   Lab 01/31/20  0926   LABBLOO No Growth to date  No Growth to date  No Growth to date  No Growth to date  No Growth to date  No Growth to date     CBC:   Recent Labs   Lab 02/01/20  0535 02/02/20  1341   WBC 3.55* 4.34   HGB 11.0* 11.2*   HCT 35.7* 36.4*    183     CMP:   Recent Labs   Lab 02/01/20  0536 02/02/20  0836    144   K 3.3* 4.1    108   CO2 30* 26   * 126*   BUN 8 8   CREATININE 0.7 0.7   CALCIUM 8.3* 8.7   PROT  --  6.0   ALBUMIN  --  2.7*   BILITOT  --  0.5   ALKPHOS  --  74   AST  --  27   ALT  --  9*   ANIONGAP 6* 10   EGFRNONAA >60 >60     Urine Studies:   Recent Labs   Lab 01/26/20  1605   COLORU Yellow   APPEARANCEUA Clear   PHUR 7.0   SPECGRAV 1.010   PROTEINUA Negative   GLUCUA Negative   KETONESU Negative   BILIRUBINUA Negative   OCCULTUA Negative   NITRITE Negative   UROBILINOGEN 1.0   LEUKOCYTESUR Negative       Significant Imaging: no new images since 1/28

## 2020-02-02 NOTE — PROGRESS NOTES
[facility]  Cardiology  Progress Note    Patient Name: Dori Whatley  MRN: [id number]  Admission Date: 1/24/2020  Hospital Length of Stay: 8 days  Code Status: Full Code   Attending Physician: Vadim Good MD   Primary Care Physician: Tru Sanchez MD  Expected Discharge Date:   Principal Problem:Pericardial effusion    Subjective:     Hospital Course:   1/25/2020 Transferred from [place] with pericardial effusion in need for pericardiocentesis. Echo at [facility] with large pericardial effusion with early tamponade physiology/RA collapse. BP stable and Eliquis held.   1/26/2020 Patient is doing well, Had episode of mild resp distress required NC o2. Now of oxygen and doing well.   1/27/2020 NPO for pericardiocentesis today  1/28/2020 Pericardiocentesis yesterday with approximately 920cc removed with fluid sent to lab for analysis. Admitted to ICU with pericardial drain in place. No output overnight per bedside RN with notation of 5-10cc this morning so far. Patient reports breathing improved and able to lie flat overnight. HR and BP stable overnight. Decrease of urine output noted yesterday with NS at 75cc/hr ordered with 750cc out overnight -2.8 liters since admission. Will plan for repeat limited echo today with pericardial drain removal once output decreased/non existent   1/29/2020 Repeat echo yesterday with resolution of pericardial effusion and removal of drain. IR consulted with minimal/trace pleural effusions therefore no repeat thoracentesis needed. Transferred to floor. HR and BP stable. PT and OT on board. Planning to discharge to rehab in [place] once approval obtained   1/30/2020 HR stable overnight. BP 140s-160s/60s overnight. CBC and BMP stable. Complaints of SOB this morning relieved with sitting up and drinking coffee. Culture with staphylococcus hominin on prelim report-will consult ID   01/31/2020 Hemodynamically stable, afebrile. No further SOB. Blood cultures  obtained per ID recs. Patient ambulating in carr with assistance.   2/1: Pt seen and examined. No overnight events. Started on cefazolin. Doing well. No temp    Review of Systems   Constitution: Negative for chills, decreased appetite, malaise/fatigue and weight gain.   HENT: Negative for congestion and ear discharge.    Eyes: Negative for blurred vision and double vision.   Cardiovascular: Positive for chest pain. Negative for cyanosis, dyspnea on exertion, irregular heartbeat, leg swelling, near-syncope, orthopnea, palpitations and syncope.   Respiratory: Negative for cough, shortness of breath and sleep disturbances due to breathing.    Skin: Negative for color change and dry skin.   Musculoskeletal: Negative for joint pain, joint swelling and muscle cramps.   Gastrointestinal: Negative for bloating, heartburn, hematemesis and hematochezia.   Genitourinary: Negative for bladder incontinence and dysuria.   Neurological: Negative for aphonia, excessive daytime sleepiness, dizziness, focal weakness, headaches, light-headedness, loss of balance and weakness.   Psychiatric/Behavioral: Negative for altered mental status, depression and memory loss. The patient does not have insomnia and is not nervous/anxious.      Objective:     Vital Signs (Most Recent):  Temp: 98.2 °F (36.8 °C) (02/01/20 1550)  Pulse: 78 (02/01/20 1600)  Resp: 18 (02/01/20 1550)  BP: 134/73 (02/01/20 1550)  SpO2: 95 % (02/01/20 2012) Vital Signs (24h Range):  Temp:  [97.1 °F (36.2 °C)-99.2 °F (37.3 °C)] 98.2 °F (36.8 °C)  Pulse:  [60-78] 78  Resp:  [16-18] 18  SpO2:  [92 %-96 %] 95 %  BP: (134-165)/(63-75) 134/73     Weight: 63.1 kg (139 lb 1.8 oz)  Body mass index is 21.79 kg/m².    SpO2: 95 %  O2 Device (Oxygen Therapy): room air      Intake/Output Summary (Last 24 hours) at 2/1/2020 2022  Last data filed at 2/1/2020 1500  Gross per 24 hour   Intake 540 ml   Output 1950 ml   Net -1410 ml       Physical Exam   Constitutional: She is oriented to  person, place, and time. She appears well-developed and well-nourished.   HENT:   Head: Normocephalic and atraumatic.   Eyes: Conjunctivae and EOM are normal.   Neck: Normal range of motion. Neck supple. No JVD present.   Cardiovascular: Normal rate, regular rhythm and normal heart sounds.   No murmur heard.  Pulmonary/Chest: Effort normal and breath sounds normal. No respiratory distress. She has no wheezes. She has no rales.   Abdominal: Soft. Bowel sounds are normal. She exhibits no distension.   Musculoskeletal: Normal range of motion. She exhibits no edema.   Neurological: She is alert and oriented to person, place, and time.   Skin: Skin is warm and dry. No erythema.   Psychiatric: She has a normal mood and affect. Her behavior is normal. Judgment and thought content normal.   Nursing note and vitals reviewed.      Significant Labs:   BMP:   Recent Labs   Lab 01/31/20  0602 02/01/20  0536   * 112*    142   K 3.7 3.3*    106   CO2 29 30*   BUN 6* 8   CREATININE 0.6 0.7   CALCIUM 8.9 8.3*   , CMP   Recent Labs   Lab 01/31/20  0602 02/01/20  0536    142   K 3.7 3.3*    106   CO2 29 30*   * 112*   BUN 6* 8   CREATININE 0.6 0.7   CALCIUM 8.9 8.3*   ANIONGAP 7* 6*   ESTGFRAFRICA >60 >60   EGFRNONAA >60 >60   , CBC   Recent Labs   Lab 01/31/20  0602 02/01/20  0535   WBC 4.32 3.55*   HGB 11.3* 11.0*   HCT 36.6* 35.7*    174    and Troponin No results for input(s): TROPONINI in the last 48 hours.    Significant Imaging: Echocardiogram:   2D echo with color flow doppler: No results found for this or any previous visit. and Transthoracic echo (TTE) complete (Cupid Only):   Results for orders placed or performed during the hospital encounter of 01/24/20   Echo Color Flow Doppler? Yes    Narrative    · Normal EF  · Pericardial effusion is resolved  · There is a pleural effusion        Assessment and Plan:       Active Diagnoses:    Diagnosis Date Noted POA    PRINCIPAL PROBLEM:   Pericardial effusion [I31.3] 01/24/2020 Yes    Staph infection [B95.8]  Unknown    Physical debility [R53.81] 01/29/2020 Yes    Paroxysmal atrial fibrillation [I48.0] 01/25/2020 Yes    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 Cardiac tamponade with pericardial effusion  - s/p 1 L pericardicentesis on 1/27  Now thought to be infectious e/o  Started cefazolin last night for positive pericardial culture with coag negative staph and elevated WBC in pericardial flu per ID  Will follow blood cultures    Doing well post-op, no acute changes    VTE Risk Mitigation (From admission, onward)         Ordered     apixaban tablet 5 mg  2 times daily      01/30/20 1004     Place sequential compression device  Until discontinued      01/25/20 1210     IP VTE LOW RISK PATIENT  Once      01/24/20 1919     Place LASHANDA hose  Until discontinued      01/24/20 1919                Merrill Shipman MD  Cardiology  [facility]

## 2020-02-02 NOTE — PLAN OF CARE
Problem: Physical Therapy Goal  Goal: Physical Therapy Goal  Description  Goals to be met by: 2020     Patient will increase functional independence with mobility by performin. Supine to sit with Modified Isabela  2. Sit to stand transfer with Modified Isabela  3. Bed to chair transfer with Modified Isabela using Rolling Walker  4. Gait  x 150 feet with Modified Isabela using Rolling Walker.      Outcome: Ongoing, Progressing   Pt continues to work and progress toward all goals. Able to perform ambulation training ~150 ft with RW and min A with verbal cueing to stay within RW boundaries at end of distance.

## 2020-02-03 VITALS
HEART RATE: 68 BPM | TEMPERATURE: 98 F | WEIGHT: 139.13 LBS | SYSTOLIC BLOOD PRESSURE: 142 MMHG | DIASTOLIC BLOOD PRESSURE: 65 MMHG | RESPIRATION RATE: 18 BRPM | HEIGHT: 67 IN | OXYGEN SATURATION: 95 % | BODY MASS INDEX: 21.84 KG/M2

## 2020-02-03 LAB
ANION GAP SERPL CALC-SCNC: 4 MMOL/L (ref 8–16)
BASOPHILS # BLD AUTO: 0.01 K/UL (ref 0–0.2)
BASOPHILS NFR BLD: 0.2 % (ref 0–1.9)
BUN SERPL-MCNC: 7 MG/DL (ref 8–23)
CALCIUM SERPL-MCNC: 8.8 MG/DL (ref 8.7–10.5)
CHLORIDE SERPL-SCNC: 107 MMOL/L (ref 95–110)
CO2 SERPL-SCNC: 33 MMOL/L (ref 23–29)
CREAT SERPL-MCNC: 0.7 MG/DL (ref 0.5–1.4)
DIFFERENTIAL METHOD: ABNORMAL
EOSINOPHIL # BLD AUTO: 0.1 K/UL (ref 0–0.5)
EOSINOPHIL NFR BLD: 1.9 % (ref 0–8)
ERYTHROCYTE [DISTWIDTH] IN BLOOD BY AUTOMATED COUNT: 14.9 % (ref 11.5–14.5)
EST. GFR  (AFRICAN AMERICAN): >60 ML/MIN/1.73 M^2
EST. GFR  (NON AFRICAN AMERICAN): >60 ML/MIN/1.73 M^2
GLUCOSE SERPL-MCNC: 113 MG/DL (ref 70–110)
HCT VFR BLD AUTO: 36.8 % (ref 37–48.5)
HGB BLD-MCNC: 11.4 G/DL (ref 12–16)
LYMPHOCYTES # BLD AUTO: 1.4 K/UL (ref 1–4.8)
LYMPHOCYTES NFR BLD: 32.5 % (ref 18–48)
MCH RBC QN AUTO: 28.1 PG (ref 27–31)
MCHC RBC AUTO-ENTMCNC: 31 G/DL (ref 32–36)
MCV RBC AUTO: 91 FL (ref 82–98)
MONOCYTES # BLD AUTO: 0.5 K/UL (ref 0.3–1)
MONOCYTES NFR BLD: 10.6 % (ref 4–15)
NEUTROPHILS # BLD AUTO: 2.3 K/UL (ref 1.8–7.7)
NEUTROPHILS NFR BLD: 54.8 % (ref 38–73)
PLATELET # BLD AUTO: 179 K/UL (ref 150–350)
PMV BLD AUTO: 11.4 FL (ref 9.2–12.9)
POCT GLUCOSE: 109 MG/DL (ref 70–110)
POCT GLUCOSE: 157 MG/DL (ref 70–110)
POTASSIUM SERPL-SCNC: 4.2 MMOL/L (ref 3.5–5.1)
RBC # BLD AUTO: 4.05 M/UL (ref 4–5.4)
SODIUM SERPL-SCNC: 144 MMOL/L (ref 136–145)
WBC # BLD AUTO: 4.24 K/UL (ref 3.9–12.7)

## 2020-02-03 PROCEDURE — 25000003 PHARM REV CODE 250: Performed by: NURSE PRACTITIONER

## 2020-02-03 PROCEDURE — 97110 THERAPEUTIC EXERCISES: CPT | Mod: CO

## 2020-02-03 PROCEDURE — 85025 COMPLETE CBC W/AUTO DIFF WBC: CPT

## 2020-02-03 PROCEDURE — 99239 PR HOSPITAL DISCHARGE DAY,>30 MIN: ICD-10-PCS | Mod: ,,, | Performed by: STUDENT IN AN ORGANIZED HEALTH CARE EDUCATION/TRAINING PROGRAM

## 2020-02-03 PROCEDURE — 63600175 PHARM REV CODE 636 W HCPCS: Performed by: INTERNAL MEDICINE

## 2020-02-03 PROCEDURE — 97530 THERAPEUTIC ACTIVITIES: CPT | Mod: CO

## 2020-02-03 PROCEDURE — 80048 BASIC METABOLIC PNL TOTAL CA: CPT

## 2020-02-03 PROCEDURE — 94761 N-INVAS EAR/PLS OXIMETRY MLT: CPT

## 2020-02-03 PROCEDURE — 36415 COLL VENOUS BLD VENIPUNCTURE: CPT

## 2020-02-03 PROCEDURE — 99239 HOSP IP/OBS DSCHRG MGMT >30: CPT | Mod: ,,, | Performed by: STUDENT IN AN ORGANIZED HEALTH CARE EDUCATION/TRAINING PROGRAM

## 2020-02-03 RX ORDER — INSULIN ASPART 100 [IU]/ML
0-5 INJECTION, SOLUTION INTRAVENOUS; SUBCUTANEOUS
Qty: 1 SYRINGE | Refills: 0
Start: 2020-02-03 | End: 2021-03-22

## 2020-02-03 RX ORDER — METHOCARBAMOL 500 MG/1
500 TABLET, FILM COATED ORAL 4 TIMES DAILY PRN
Start: 2020-02-03

## 2020-02-03 RX ORDER — SODIUM CHLORIDE 0.9 % (FLUSH) 0.9 %
10 SYRINGE (ML) INJECTION
Status: DISCONTINUED | OUTPATIENT
Start: 2020-02-03 | End: 2020-02-03 | Stop reason: HOSPADM

## 2020-02-03 RX ORDER — SODIUM CHLORIDE 0.9 % (FLUSH) 0.9 %
10 SYRINGE (ML) INJECTION EVERY 8 HOURS
Qty: 30 ML | Refills: 0
Start: 2020-02-03

## 2020-02-03 RX ORDER — ACETAMINOPHEN 325 MG/1
650 TABLET ORAL EVERY 4 HOURS PRN
Qty: 30 TABLET | Refills: 0
Start: 2020-02-03

## 2020-02-03 RX ORDER — POLYETHYLENE GLYCOL 3350 17 G/17G
17 POWDER, FOR SOLUTION ORAL 2 TIMES DAILY PRN
Qty: 24 PACKET | Refills: 0
Start: 2020-02-03

## 2020-02-03 RX ORDER — CEFAZOLIN SODIUM 2 G/50ML
2000 SOLUTION INTRAVENOUS EVERY 8 HOURS
Qty: 3750 ML | Refills: 0
Start: 2020-02-03 | End: 2020-02-28

## 2020-02-03 RX ORDER — ONDANSETRON 8 MG/1
8 TABLET, ORALLY DISINTEGRATING ORAL EVERY 8 HOURS PRN
Qty: 30 TABLET | Refills: 11
Start: 2020-02-03 | End: 2021-03-22

## 2020-02-03 RX ORDER — SODIUM CHLORIDE 0.9 % (FLUSH) 0.9 %
10 SYRINGE (ML) INJECTION EVERY 6 HOURS
Status: DISCONTINUED | OUTPATIENT
Start: 2020-02-03 | End: 2020-02-03 | Stop reason: HOSPADM

## 2020-02-03 RX ADMIN — APIXABAN 5 MG: 5 TABLET, FILM COATED ORAL at 08:02

## 2020-02-03 RX ADMIN — ACETAMINOPHEN 650 MG: 325 TABLET ORAL at 09:02

## 2020-02-03 RX ADMIN — CLOPIDOGREL BISULFATE 75 MG: 75 TABLET, FILM COATED ORAL at 08:02

## 2020-02-03 RX ADMIN — CARVEDILOL 6.25 MG: 6.25 TABLET, FILM COATED ORAL at 08:02

## 2020-02-03 RX ADMIN — CEFAZOLIN SODIUM 2 G: 2 SOLUTION INTRAVENOUS at 08:02

## 2020-02-03 RX ADMIN — GABAPENTIN 300 MG: 300 CAPSULE ORAL at 02:02

## 2020-02-03 RX ADMIN — CEFAZOLIN SODIUM 2 G: 2 SOLUTION INTRAVENOUS at 01:02

## 2020-02-03 RX ADMIN — GABAPENTIN 300 MG: 300 CAPSULE ORAL at 08:02

## 2020-02-03 RX ADMIN — PANTOPRAZOLE SODIUM 40 MG: 40 TABLET, DELAYED RELEASE ORAL at 08:02

## 2020-02-03 RX ADMIN — DULOXETINE 30 MG: 30 CAPSULE, DELAYED RELEASE ORAL at 08:02

## 2020-02-03 RX ADMIN — LOSARTAN POTASSIUM 25 MG: 25 TABLET, FILM COATED ORAL at 08:02

## 2020-02-03 NOTE — PLAN OF CARE
Pt in process of receiving PICC line, will await x-ray confirmation.  Primary team to place Facility Transfer Orders to include:    cbc and cmp weekly while on antibiotics   Please fax labs to [facility] C/O Moody ([phone]) until clinic appointment with [facility] .     Future Appointments   Date Time Provider Department Center   2/19/2020  2:30 PM INFECTIOUS DISEASE, [facility] INFECT Bib Kim at [facility] is working on auth at this time.      1230  auth still current.  Pt will be transfer once PICC line    Karmen Navarro RN    [phone]

## 2020-02-03 NOTE — PT/OT/SLP PROGRESS
Occupational Therapy   Treatment    Name: Dori Whatley  MRN: [id number]  Admitting Diagnosis:  Pericardial effusion  7 Days Post-Op    Recommendations:     Discharge Recommendations: nursing facility, skilled  Discharge Equipment Recommendations:  none  Barriers to discharge:  None    Assessment:   Patient very pleasant and motivated for therapy. Eager to D/C to SNF for continued therapies. Patient making good progress towards goals. Cont OT.    Dori Whatley is a 79 y.o. female with a medical diagnosis of Pericardial effusion. Performance deficits affecting function are weakness, impaired endurance, impaired self care skills, impaired functional mobilty, gait instability, impaired balance, decreased upper extremity function, decreased ROM.     Rehab Prognosis:  Good; patient would benefit from acute skilled OT services to address these deficits and reach maximum level of function.       Plan:     Patient to be seen 5 x/week to address the above listed problems via self-care/home management, therapeutic activities, therapeutic exercises  · Plan of Care Expires: 02/29/20  · Plan of Care Reviewed with: patient    Subjective     Pain/Comfort:  · Pain Rating 1: 0/10  · Pain Rating Post-Intervention 1: 0/10    Objective:     Communicated with: Mita english prior to session.  Patient found HOB elevated with bed alarm, telemetry, peripheral IV upon OT entry to room.    General Precautions: Standard, fall   Orthopedic Precautions:N/A   Braces: N/A     Bed Mobility:    · Patient completed Rolling/Turning to Left with  stand by assistance  · Patient completed Scooting/Bridging with stand by assistance  · Patient completed Supine to Sit with contact guard assistance     Functional Mobility/Transfers:  · Patient completed Sit <> Stand Transfer with contact guard assistance  with  rolling walker     Activities of Daily Living:  · Grooming: contact guard assistance to wash hands at sink    [facility] 6 Click ADL:  16    Treatment & Education:  Patient very eager for therapy. Bed mob as noted above. Patient requesting to ambulate in carr; completed /c CG and VCs for RW proximity and upright posture. Patient washed hands at sink upon returning to room and then stepped to b/s chair. Instructed in BUE AROM (limited on R shoulder), but able to complete hand pumps and bicep curls x 10 reps.     Patient left up in chair with all lines intact, call button in reach, chair alarm on, nurse notified and  presentEducation:      GOALS:   Multidisciplinary Problems     Occupational Therapy Goals     Not on file          Multidisciplinary Problems (Resolved)        Problem: Occupational Therapy Goal    Goal Priority Disciplines Outcome Interventions   Occupational Therapy Goal   (Resolved)     OT, PT/OT Met    Description:  Goals to be met by: 2/29/20     Patient will increase functional independence with ADLs by performing:    LE Dressing with Supervision.  Grooming while standing with Supervision.  Toileting from toilet with Supervision for hygiene and clothing management.   Supine to sit with Supervision.  Step transfer with Supervision  Toilet transfer to toilet with Supervision.  Upper extremity exercise program x10 reps per handout, with independence.                      Time Tracking:     OT Date of Treatment: 02/03/20  OT Start Time: 1038  OT Stop Time: 1102  OT Total Time (min): 24 min    Billable Minutes:Therapeutic Activity 14  Therapeutic Exercise 10    ELSA Willard  2/3/2020

## 2020-02-03 NOTE — CARE UPDATE
[facility]    FACILITY TRANSFER ORDERS      Patient Name: Dori Whatley  YOB: 1940    PCP: Tru Sanchez MD   PCP Address: [address] / [place] [address]  PCP Phone Number: [phone]  PCP Fax: [phone]    Encounter Date: 02/03/2020    Admit to: [facility]    Vital Signs:  Routine    Diagnoses:   Active Hospital Problems    Diagnosis  POA    *Pericardial effusion [I31.3]  Yes    Staph infection [B95.8]  Unknown    Physical debility [R53.81]  Yes    Paroxysmal atrial fibrillation [I48.0]  Yes    Postsurgical cardiac pacemaker in situ [Z95.0]  Unknown    Hypertension [I10]  Yes      Resolved Hospital Problems   No resolved problems to display.       Allergies:  Review of patient's allergies indicates:   Allergen Reactions    Nitrofurantoin monohyd/m-cryst Rash and Shortness Of Breath       Diet: diabetic diet: 1800 calorie cardiac diet     Activities: Activity as tolerated    Nursing: Vital signs per routine     Labs: CBC and CMP weekly while on IV Ancef; Fax results to Dr. Moody with [facility] at [phone]    [facility]    CONSULTS:    Physical Therapy to evaluate and treat.  and Occupational Therapy to evaluate and treat.    MISCELLANEOUS CARE:  Midline PICC care: flush ports every 8hours with 10cc NS as well as before and after IVPB and blood draws    Change dressing every 7 days or if visible soiled or nonocclusive       Medications: Review discharge medications with patient and family and provide education.      Current Discharge Medication List      START taking these medications    Details   acetaminophen (TYLENOL) 325 MG tablet Take 2 tablets (650 mg total) by mouth every 4 (four) hours as needed for Pain or Temperature greater than (101.5).  Qty: 30 tablet, Refills: 0      apixaban (ELIQUIS) 5 mg Tab Take 1 tablet (5 mg total) by mouth 2 (two) times daily.  Qty: 60 tablet, Refills: 11      ceFAZolin 2 g/50mL  Dextrose IVPB (ANCEF) 2 gram/50 mL PgBk Inject 50 mLs (2,000 mg total) into the vein every 8 (eight) hours. for 25 days (start date 2/2/2020-2/28/2020)  Qty: 3750 mL, Refills: 0      insulin aspart U-100 (NOVOLOG) 100 unit/mL (3 mL) InPn pen Inject 0-5 Units into the skin before meals and at bedtime as needed (Hyperglycemia).  Qty: 1 Syringe, Refills: 0    Comments:   150-200 2 units  201-250 4 units  251-300 6 units   301-350 8 units   351-400 10 units and call MD  >401 call MD      ondansetron (ZOFRAN-ODT) 8 MG TbDL Take 1 tablet (8 mg total) by mouth every 8 (eight) hours as needed for nausea   Qty: 30 tablet, Refills: 11      polyethylene glycol (GLYCOLAX) 17 gram PwPk Take 17 g by mouth 2 (two) times daily as needed for constipation   Qty: 24 packet, Refills: 0      sodium chloride 0.9% (NORMAL SALINE FLUSH) injection Inject 10 mLs into the vein every 8 (eight) hours.  Qty: 30 mL, Refills: 0    Comments: Flush PICC line ports every hours with 10cc NS as well as before and after IV infusions and blood draws         CONTINUE these medications which have CHANGED    Details   methocarbamol (ROBAXIN) 500 MG Tab Take 1 tablet (500 mg total) by mouth 4 (four) times daily as needed (muscle spasms).         CONTINUE these medications which have NOT CHANGED    Details      carvedilol (COREG) 3.125 MG tablet Take 3.125 mg by mouth 2 (two) times daily.       clopidogrel (PLAVIX) 75 mg tablet Take 75 mg by mouth once daily.       duloxetine (CYMBALTA) 30 MG capsule Take 30 mg by mouth once daily.      glipiZIDE (GLUCOTROL) 5 MG TR24 Take 5 mg by mouth daily with breakfast.       losartan-hydrochlorothiazide 100-12.5 mg (HYZAAR) 100-12.5 mg Tab Take 1 tablet by mouth once daily.      pantoprazole (PROTONIX) 40 MG tablet Take 40 mg by mouth once daily.       ergocalciferol (VITAMIN D2) 50,000 unit Cap Take 50,000 Units by mouth every 7 days.      gabapentin (NEURONTIN) 300 MG capsule Take 300 mg by mouth 3 (three) times  daily.         STOP taking these medications       hydrocodone-acetaminophen 10-325mg (NORCO)  mg Tab Comments:   Reason for Stopping:                    _________________________________  TSERING Sena ANP  02/03/2020

## 2020-02-03 NOTE — NURSING
Consulted to place PICC line.   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  L side pacemaker.  Unable to thread on L side due to throuble threading with pacemaker.  Spoke to MD and explained situation. Order Ok to leave midline.  MIDLINE in place, ok to use.

## 2020-02-03 NOTE — DISCHARGE SUMMARY
[facility]  Cardiology  Discharge Summary      Patient Name: Dori Whatley  MRN: [id number]  Admission Date: 1/24/2020  Hospital Length of Stay: 10 days  Discharge Date and Time: 2/3/2020  Attending Physician: Vadim Good MD  Discharging Provider: TSERING Sena, ANP  Primary Care Physician: Tru Sanchez MD    HPI: 79 year old female with advanced dementia  Patient was in her usual state of health till January 6 when she has pneumonia picture and was admitted to [facility]. She stayed for one week then was discharged to LTAC at [facility], where she had shortness of breath and was found to have Pleural effusion, she had thoracentesis  That was followed by CT chest which showed pericardial effusion and Echo was done that showed large pericardial effusion. Patient is hemodynamically stable BP toward the higher side. Transferred to [facility] for possible pericardiocentesis.      According to the family she was hospitalized at [place] and was completely confused for 3 days ? Viral illness. Also her blood count was low and hematology was consulted.      She has PPM that was placed by Dr. Garcia for CHB, hx of A.fib (Eliquis) from [place] [facility] about 3 months ago, she was on eliquis at home. No hx of PCI or CABG      Also she has hx of breast CA s/p surgery 30 years ago and she gets yearly Mammogram, according to them last one was last year and was ok.      Procedure(s) (LRB):  Pericardiocentesis (N/A)     Indwelling Lines/Drains at time of discharge:  Lines/Drains/Airways     None                 Hospital Course    1/25/2020 Transferred from [place] with pericardial effusion in need for pericardiocentesis. Echo at [facility] with large pericardial effusion with early tamponade physiology/RA collapse. BP stable and Eliquis held.   1/26/2020 Patient is doing well, Had episode of mild resp distress required NC o2. Now of oxygen and doing well.    1/27/2020 NPO for pericardiocentesis today  1/28/2020 Pericardiocentesis yesterday with approximately 920cc removed with fluid sent to lab for analysis. Admitted to ICU with pericardial drain in place. No output overnight per bedside RN with notation of 5-10cc this morning so far. Patient reports breathing improved and able to lie flat overnight. HR and BP stable overnight. Decrease of urine output noted yesterday with NS at 75cc/hr ordered with 750cc out overnight -2.8 liters since admission. Will plan for repeat limited echo today with pericardial drain removal once output decreased/non existent   1/29/2020 Repeat echo yesterday with resolution of pericardial effusion and removal of drain. IR consulted with minimal/trace pleural effusions therefore no repeat thoracentesis needed. Transferred to floor. HR and BP stable. PT and OT on board. Planning to discharge to rehab in [place] once approval obtained   1/30/2020 HR stable overnight. BP 140s-160s/60s overnight. CBC and BMP stable. Complaints of SOB this morning relieved with sitting up and drinking coffee. Culture with staphylococcus hominin on prelim report-will consult ID   01/31/2020 Hemodynamically stable, afebrile. No further SOB. Blood cultures obtained per ID recs. Patient ambulating in carr with assistance.   2/1: Pt seen and examined. No overnight events. Started on cefazolin. Doing well. No temp  02/2/2020 Hemodynamically stable. Waiting for final abx recs from ID  2/3/2020 Received final abx recs from ID with recommendation for Ancef 2g IV Q8hrs. PICC line team consulted with inability to place PICC line due to pacemaker. Midline PICC placed instead. Blood cultures with NGTD. Deemed ready for discharge back to [place] [facility] with appropriate orders written. Will need follow up with ID as well as Dr. Good or Dr. Shipman in [place] in 2-3 weeks     Consults:   Consults (From admission, onward)        Status Ordering Provider      Inpatient consult to Anesthesiology  Once     Provider:  Dinesh Christensen MD    Acknowledged LETY LEE     Inpatient consult to Infectious Diseases  Once     Provider:  (Not yet assigned)    Completed LETY LEE     Inpatient consult to Interventional Radiology  Once     Provider:  (Not yet assigned)    Completed LETY LEE     Inpatient consult to PICC team (NIAS)  Once     Provider:  (Not yet assigned)    Acknowledged LETY LEE     IP consult to case management  Once     Provider:  (Not yet assigned)    Acknowledged LETY LEE          Significant Diagnostic Studies: Cardiac Graphics: Echocardiogram:   Transthoracic echo (TTE) complete (Cupid Only):   Results for orders placed or performed during the hospital encounter of 01/24/20   Echo Color Flow Doppler? Yes    Narrative    · Normal EF  · Pericardial effusion is resolved  · There is a pleural effusion          Pending Diagnostic Studies:     Procedure Component Value Units Date/Time    Cytology, Fluid/Wash/Brush [[id number]] Collected:  01/27/20 1500    Order Status:  Sent Lab Status:  In process Updated:  01/27/20 2116          Final Active Diagnoses:    Diagnosis Date Noted POA    PRINCIPAL PROBLEM:  Pericardial effusion [I31.3] 01/24/2020 Yes    Staph infection [B95.8]  Unknown    Physical debility [R53.81] 01/29/2020 Yes    Paroxysmal atrial fibrillation [I48.0] 01/25/2020 Yes    Postsurgical cardiac pacemaker in situ [Z95.0] 01/25/2020 Unknown    Hypertension [I10] 10/13/2014 Yes      Problems Resolved During this Admission:       Discharged Condition: good    Follow Up:  Follow-up Information     [place] - Infectious Disease On 2/19/2020.    Specialty:  Infectious Diseases  Why:  2:30 PM   Contact information:  [address]  [facility] [address] [phone]           [facility] Cardiology On 2/17/2020.    Specialty:  Cardiology  Why:  10:00 AM with DR Vadim Good  Contact  information:  [address] [place] [address] [phone]               Patient Instructions:      Diet diabetic     Diet Cardiac     Activity as tolerated     Medications:  Reconciled Home Medications:      Medication List      START taking these medications    acetaminophen 325 MG tablet  Commonly known as:  TYLENOL  Take 2 tablets (650 mg total) by mouth every 4 (four) hours as needed for Pain or Temperature greater than (101.5).     apixaban 5 mg Tab  Commonly known as:  ELIQUIS  Take 1 tablet (5 mg total) by mouth 2 (two) times daily.     ceFAZolin 2 g/50mL Dextrose IVPB 2 gram/50 mL Pgbk  Commonly known as:  ANCEF  Inject 50 mLs (2,000 mg total) into the vein every 8 (eight) hours. for 25 days     insulin aspart U-100 100 unit/mL (3 mL) Inpn pen  Commonly known as:  NovoLOG  Inject 0-5 Units into the skin before meals and at bedtime as needed (Hyperglycemia).     ondansetron 8 MG Tbdl  Commonly known as:  ZOFRAN-ODT  Take 1 tablet (8 mg total) by mouth every 8 (eight) hours as needed.     polyethylene glycol 17 gram Pwpk  Commonly known as:  GLYCOLAX  Take 17 g by mouth 2 (two) times daily as needed.     sodium chloride 0.9% injection  Commonly known as:  NORMAL SALINE FLUSH  Inject 10 mLs into the vein every 8 (eight) hours.        CHANGE how you take these medications    methocarbamol 500 MG Tab  Commonly known as:  ROBAXIN  Take 1 tablet (500 mg total) by mouth 4 (four) times daily as needed (muscle spasms).  What changed:    · medication strength  · when to take this  · reasons to take this        CONTINUE taking these medications    ammonium lactate 12 % Crea  Apply 1 Act topically 2 (two) times daily.     carvediloL 3.125 MG tablet  Commonly known as:  COREG  Take 3.125 mg by mouth 2 (two) times daily.     clopidogreL 75 mg tablet  Commonly known as:  PLAVIX  Take 75 mg by mouth once daily.     DULoxetine 30 MG capsule  Commonly known as:  CYMBALTA  Take 30 mg by mouth once daily.      gabapentin 300 MG capsule  Commonly known as:  NEURONTIN  Take 300 mg by mouth 3 (three) times daily.     glipiZIDE 5 MG Tr24  Commonly known as:  GLUCOTROL  Take 5 mg by mouth daily with breakfast.     losartan-hydrochlorothiazide 100-12.5 mg 100-12.5 mg Tab  Commonly known as:  HYZAAR  Take 1 tablet by mouth once daily.     Protonix 40 MG tablet  Generic drug:  pantoprazole  Take 40 mg by mouth once daily.     Vitamin D2 50,000 unit Cap  Generic drug:  ergocalciferol  Take 50,000 Units by mouth every 7 days.        STOP taking these medications    HYDROcodone-acetaminophen  mg per tablet  Commonly known as:  NORCO            Time spent on the discharge of patient: 45 minutes    TSERING Sena, CAROL  Cardiology  [facility]

## 2020-02-03 NOTE — PLAN OF CARE
Updated clinicals to [facility].  Awaiting final sensitivities, ID rec for duration of cefazolin, PICC line for transfer back to [facility].        [facility] and [facility] notified of final ID recs Cefazolin q8hrs end date 2/28.  Will awaie PICC line placement, per nursing, supervisor has been notified and PICC team called.  All made aware of plan to transfer pt today.  Will follow.    Karmen Navarro RN    [phone]

## 2020-02-03 NOTE — PLAN OF CARE
02/03/20 1332   Final Note   Assessment Type Final Discharge Note   Anticipated Discharge Disposition SNF  ([facility])   Hospital Follow Up  Appt(s) scheduled? Yes   Discharge plans and expectations educations in teach back method with documentation complete? Yes   Right Care Referral Info   Post Acute Recommendation No Care       Nurse to call report to [facility] to [phone].  Transport arranged for w/c van via [facility].  Pt and spouse aware of tranfer today, pleased with dispo.    Karmen Navarro RN    [phone]

## 2020-02-03 NOTE — PLAN OF CARE
Lab requirements and f/u appt are as follow:      cbc and cmp weekly while on antibiotics   Please fax labs to [facility] [place] ([phone]) until clinic appointment with [facility] .       Future Appointments   Date Time Provider Department Center   2/17/2020 10:00 AM Vadim Good MD [facility] CARDIO [place]   2/19/2020  2:30 PM INFECTIOUS DISEASE, [facility] INFECT [facility]

## 2020-02-03 NOTE — PLAN OF CARE
Plan of care reviewed with patient and family; questions answered by RN. Pt repositioning self in bed. Pt denies pain

## 2020-02-03 NOTE — PT/OT/SLP PROGRESS
Physical Therapy      Patient Name:  Dori Whatley   MRN:  [id number]    Patient up in chair at bedside just finished working with O T. Requested to be seen later. Will follow up as able.    Mario Hernandez, PT

## 2020-02-03 NOTE — NURSING
VN entered into the patient's room with permission via [employer] system. VN present for L PICC Line placement and time out. (See time out documentation for details.) Xray released and notified via phone. Patient tolerated procedure well. NAD noted.

## 2020-02-03 NOTE — PLAN OF CARE
Patient ordered to be discharged. VN and  have signed off on case. Patient's telemetry and PIV removed. Left Upper arm midline remains in place. VN notified that patient is ready for discharge instructions. Patient stable and denies further needs at this time.     Appropriate staff notified of patient's discharge status.    Report given to Cece at [facility]. Chart reviewed and all questions answered. Transporter given brief report as well.

## 2020-02-04 ENCOUNTER — TELEPHONE (OUTPATIENT)
Dept: CARDIOLOGY | Facility: CLINIC | Age: 80
End: 2020-02-04

## 2020-02-04 NOTE — TELEPHONE ENCOUNTER
----- Message from Anabela Marie sent at 2/3/2020  5:41 PM CST -----  Regarding: Specimen Issue     There was an issue with the following  Test, Amylase, Lactic and Glucose Body Fluid ordered on this patient from 01/27/2020.    Unfortunately, the reference lab received a sample that was too hemolyzed for testing. The order has been cancelled and will need to be reordered and recollected.    Please call the Sendout lab at [phone] ext. [phone] if there are any questions.  Anyone in the Sendout lab will be able to assist you.

## 2020-02-05 LAB
BACTERIA BLD CULT: NORMAL
BACTERIA BLD CULT: NORMAL

## 2020-02-05 NOTE — TELEPHONE ENCOUNTER
----- Message from Michelle Armenta sent at 2/5/2020 10:03 AM CST -----  There was an issue with the Lactic Acid test ordered on this patient from 01/27/2020. Specimen was hemolyzed.   The order has been cancelled and will need to be reordered and recollected.  Please call the Sendout lab at [phone] ext. [phone] if there are any questions.  Anyone in the Sendout lab will be able to assist you

## 2020-02-19 ENCOUNTER — OFFICE VISIT (OUTPATIENT)
Dept: INFECTIOUS DISEASES | Facility: CLINIC | Age: 80
End: 2020-02-19
Payer: MEDICARE

## 2020-02-19 VITALS
DIASTOLIC BLOOD PRESSURE: 68 MMHG | TEMPERATURE: 98 F | HEART RATE: 82 BPM | SYSTOLIC BLOOD PRESSURE: 109 MMHG | BODY MASS INDEX: 21.82 KG/M2 | WEIGHT: 139 LBS | HEIGHT: 67 IN

## 2020-02-19 DIAGNOSIS — I30.8 OTHER ACUTE PERICARDITIS: Primary | ICD-10-CM

## 2020-02-19 DIAGNOSIS — B95.8 STAPH INFECTION: ICD-10-CM

## 2020-02-19 PROCEDURE — 99212 PR OFFICE/OUTPT VISIT, EST, LEVL II, 10-19 MIN: ICD-10-PCS | Mod: S$GLB,,, | Performed by: INTERNAL MEDICINE

## 2020-02-19 PROCEDURE — 1159F PR MEDICATION LIST DOCUMENTED IN MEDICAL RECORD: ICD-10-PCS | Mod: S$GLB,,, | Performed by: INTERNAL MEDICINE

## 2020-02-19 PROCEDURE — 1100F PTFALLS ASSESS-DOCD GE2>/YR: CPT | Mod: CPTII,S$GLB,, | Performed by: INTERNAL MEDICINE

## 2020-02-19 PROCEDURE — 99212 OFFICE O/P EST SF 10 MIN: CPT | Mod: S$GLB,,, | Performed by: INTERNAL MEDICINE

## 2020-02-19 PROCEDURE — 1100F PR PT FALLS ASSESS DOC 2+ FALLS/FALL W/INJURY/YR: ICD-10-PCS | Mod: CPTII,S$GLB,, | Performed by: INTERNAL MEDICINE

## 2020-02-19 PROCEDURE — 1159F MED LIST DOCD IN RCRD: CPT | Mod: S$GLB,,, | Performed by: INTERNAL MEDICINE

## 2020-02-19 PROCEDURE — 3288F PR FALLS RISK ASSESSMENT DOCUMENTED: ICD-10-PCS | Mod: CPTII,S$GLB,, | Performed by: INTERNAL MEDICINE

## 2020-02-19 PROCEDURE — 99999 PR PBB SHADOW E&M-EST. PATIENT-LVL IV: CPT | Mod: PBBFAC,,,

## 2020-02-19 PROCEDURE — 1126F PR PAIN SEVERITY QUANTIFIED, NO PAIN PRESENT: ICD-10-PCS | Mod: S$GLB,,, | Performed by: INTERNAL MEDICINE

## 2020-02-19 PROCEDURE — 99999 PR PBB SHADOW E&M-EST. PATIENT-LVL IV: ICD-10-PCS | Mod: PBBFAC,,,

## 2020-02-19 PROCEDURE — 1126F AMNT PAIN NOTED NONE PRSNT: CPT | Mod: S$GLB,,, | Performed by: INTERNAL MEDICINE

## 2020-02-19 PROCEDURE — 3288F FALL RISK ASSESSMENT DOCD: CPT | Mod: CPTII,S$GLB,, | Performed by: INTERNAL MEDICINE

## 2020-02-24 ENCOUNTER — CLINICAL SUPPORT (OUTPATIENT)
Dept: CARDIOLOGY | Facility: CLINIC | Age: 80
End: 2020-02-24
Attending: INTERNAL MEDICINE
Payer: MEDICARE

## 2020-02-24 ENCOUNTER — OUTSIDE PLACE OF SERVICE (OUTPATIENT)
Dept: CARDIOLOGY | Facility: CLINIC | Age: 80
End: 2020-02-24
Payer: MEDICARE

## 2020-02-24 DIAGNOSIS — I31.39 PERICARDIAL EFFUSION: ICD-10-CM

## 2020-02-24 PROCEDURE — 93306 ECHO (CUPID ONLY): ICD-10-PCS | Mod: 26,,, | Performed by: STUDENT IN AN ORGANIZED HEALTH CARE EDUCATION/TRAINING PROGRAM

## 2020-02-24 PROCEDURE — 99221 PR INITIAL HOSPITAL CARE,LEVL I: ICD-10-PCS | Mod: ,,, | Performed by: INTERNAL MEDICINE

## 2020-02-24 PROCEDURE — 99221 1ST HOSP IP/OBS SF/LOW 40: CPT | Mod: ,,, | Performed by: INTERNAL MEDICINE

## 2020-02-24 PROCEDURE — 93306 TTE W/DOPPLER COMPLETE: CPT | Mod: 26,,, | Performed by: STUDENT IN AN ORGANIZED HEALTH CARE EDUCATION/TRAINING PROGRAM

## 2020-02-26 LAB
AORTIC VALVE CUSP SEPERATION: 2 CM
ASCENDING AORTA: 3 CM
AV INDEX (PROSTH): 0.62
AV MEAN GRADIENT: 10 MMHG
AV PEAK GRADIENT: 16 MMHG
AV VALVE AREA: 1.94 CM2
AV VELOCITY RATIO: 0.6
CV ECHO LV RWT: 0.41 CM
DOP CALC AO PEAK VEL: 2 M/S
DOP CALC AO VTI: 40.2 CM
DOP CALC LVOT AREA: 3.1 CM2
DOP CALC LVOT DIAMETER: 2 CM
DOP CALC LVOT PEAK VEL: 1.2 M/S
DOP CALC LVOT STROKE VOLUME: 78.19 CM3
DOP CALC MV VTI: 26 CM
DOP CALCLVOT PEAK VEL VTI: 24.9 CM
ECHO LV POSTERIOR WALL: 1.1 CM (ref 0.6–1.1)
FRACTIONAL SHORTENING: 43 % (ref 28–44)
INTERVENTRICULAR SEPTUM: 1.7 CM (ref 0.6–1.1)
IVC PROX: 1.7 CM
LA MAJOR: 4.7 CM
LA MINOR: 3.1 CM
LA WIDTH: 4.4 CM
LEFT ATRIUM SIZE: 3.6 CM
LEFT ATRIUM VOLUME: 50.3 CM3
LEFT INTERNAL DIMENSION IN SYSTOLE: 3.1 CM (ref 2.1–4)
LEFT VENTRICULAR INTERNAL DIMENSION IN DIASTOLE: 5.4 CM (ref 3.5–6)
LEFT VENTRICULAR MASS: 328.33 G
MV MEAN GRADIENT: 2 MMHG
MV PEAK GRADIENT: 6 MMHG
MV VALVE AREA BY CONTINUITY EQUATION: 3.01 CM2
PISA TR MAX VEL: 2.73 M/S
PV PEAK VELOCITY: 0.88 CM/S
RA MAJOR: 4 CM
RA PRESSURE: 8 MMHG
RA WIDTH: 2.9 CM
RIGHT VENTRICULAR END-DIASTOLIC DIMENSION: 3.3 CM
SINUS: 3 CM
STJ: 3 CM
TR MAX PG: 30 MMHG
TRICUSPID ANNULAR PLANE SYSTOLIC EXCURSION: 2.1 CM
TV REST PULMONARY ARTERY PRESSURE: 38 MMHG

## 2020-02-27 LAB — FUNGUS SPEC CULT: NORMAL

## 2020-03-11 ENCOUNTER — OFFICE VISIT (OUTPATIENT)
Dept: CARDIOLOGY | Facility: CLINIC | Age: 80
End: 2020-03-11
Payer: MEDICARE

## 2020-03-11 VITALS
WEIGHT: 135.5 LBS | HEART RATE: 59 BPM | OXYGEN SATURATION: 98 % | SYSTOLIC BLOOD PRESSURE: 147 MMHG | BODY MASS INDEX: 22.57 KG/M2 | HEIGHT: 65 IN | DIASTOLIC BLOOD PRESSURE: 67 MMHG

## 2020-03-11 DIAGNOSIS — I10 HYPERTENSION, UNSPECIFIED TYPE: ICD-10-CM

## 2020-03-11 DIAGNOSIS — I31.39 PERICARDIAL EFFUSION: Primary | ICD-10-CM

## 2020-03-11 DIAGNOSIS — Z95.0 POSTSURGICAL CARDIAC PACEMAKER IN SITU: ICD-10-CM

## 2020-03-11 DIAGNOSIS — I44.2 COMPLETE HEART BLOCK: ICD-10-CM

## 2020-03-11 DIAGNOSIS — I10 BENIGN ESSENTIAL HYPERTENSION: ICD-10-CM

## 2020-03-11 DIAGNOSIS — E78.5 HYPERLIPIDEMIA, UNSPECIFIED HYPERLIPIDEMIA TYPE: ICD-10-CM

## 2020-03-11 DIAGNOSIS — I48.0 PAROXYSMAL ATRIAL FIBRILLATION: ICD-10-CM

## 2020-03-11 PROCEDURE — 99214 OFFICE O/P EST MOD 30 MIN: CPT | Mod: S$GLB,,, | Performed by: INTERNAL MEDICINE

## 2020-03-11 PROCEDURE — 3078F DIAST BP <80 MM HG: CPT | Mod: CPTII,S$GLB,, | Performed by: INTERNAL MEDICINE

## 2020-03-11 PROCEDURE — 1125F PR PAIN SEVERITY QUANTIFIED, PAIN PRESENT: ICD-10-PCS | Mod: S$GLB,,, | Performed by: INTERNAL MEDICINE

## 2020-03-11 PROCEDURE — 1125F AMNT PAIN NOTED PAIN PRSNT: CPT | Mod: S$GLB,,, | Performed by: INTERNAL MEDICINE

## 2020-03-11 PROCEDURE — 1159F PR MEDICATION LIST DOCUMENTED IN MEDICAL RECORD: ICD-10-PCS | Mod: S$GLB,,, | Performed by: INTERNAL MEDICINE

## 2020-03-11 PROCEDURE — 3288F PR FALLS RISK ASSESSMENT DOCUMENTED: ICD-10-PCS | Mod: CPTII,S$GLB,, | Performed by: INTERNAL MEDICINE

## 2020-03-11 PROCEDURE — 1100F PTFALLS ASSESS-DOCD GE2>/YR: CPT | Mod: CPTII,S$GLB,, | Performed by: INTERNAL MEDICINE

## 2020-03-11 PROCEDURE — 3288F FALL RISK ASSESSMENT DOCD: CPT | Mod: CPTII,S$GLB,, | Performed by: INTERNAL MEDICINE

## 2020-03-11 PROCEDURE — 3077F SYST BP >= 140 MM HG: CPT | Mod: CPTII,S$GLB,, | Performed by: INTERNAL MEDICINE

## 2020-03-11 PROCEDURE — 3077F PR MOST RECENT SYSTOLIC BLOOD PRESSURE >= 140 MM HG: ICD-10-PCS | Mod: CPTII,S$GLB,, | Performed by: INTERNAL MEDICINE

## 2020-03-11 PROCEDURE — 1100F PR PT FALLS ASSESS DOC 2+ FALLS/FALL W/INJURY/YR: ICD-10-PCS | Mod: CPTII,S$GLB,, | Performed by: INTERNAL MEDICINE

## 2020-03-11 PROCEDURE — 99214 PR OFFICE/OUTPT VISIT, EST, LEVL IV, 30-39 MIN: ICD-10-PCS | Mod: S$GLB,,, | Performed by: INTERNAL MEDICINE

## 2020-03-11 PROCEDURE — 1159F MED LIST DOCD IN RCRD: CPT | Mod: S$GLB,,, | Performed by: INTERNAL MEDICINE

## 2020-03-11 PROCEDURE — 3078F PR MOST RECENT DIASTOLIC BLOOD PRESSURE < 80 MM HG: ICD-10-PCS | Mod: CPTII,S$GLB,, | Performed by: INTERNAL MEDICINE

## 2020-03-11 NOTE — PATIENT INSTRUCTIONS
Aerobic Exercise for a Healthy Heart  Exercise is a lot more than an energy booster and a stress reliever. It also strengthens your heart muscle, lowers your blood pressure and cholesterol, and burns calories. It can also improve your resting muscle tone, and your mood.     Remember, some activity is better than none.    Choose an aerobic activity  Choose an activity that makes your heart and lungs work harder than they do when you rest or walk normally. This aerobic exercise can improve the way your heart and other muscles use oxygen. Make it fun by exercising with a friend and choosing an activity you enjoy. Here are some ideas:  · Walking  · Swimming  · Bicycling  · Stair climbing  · Dancing  · Jogging  · Gardening  Exercise regularly  If you havent been exercising regularly,  get your doctors OK first. Then start slowly.  Here are some tips:  · Begin exercising 3 times a week for 5 to 10 minutes at a time.  · When you feel comfortable, add a few minutes each session.  · Slowly build up to exercising 3 to 4 times each week. Each session should last for 40 minutes, on average, and involve moderate- to vigorous-intensity physical activity.  · If you have been given nitroglycerin, be sure to carry it when you exercise.  · If you get chest pain (angina) when youre exercising, stop what youre doing, take your nitroglycerin, and call your doctor.  Date Last Reviewed: 6/2/2016  © 6121-5319 [employer]. [address] [place] [address]. All rights reserved. This information is not intended as a substitute for professional medical care. Always follow your healthcare professional's instructions.          Eating Heart-Healthy Foods  Eating has a big impact on your heart health. In fact, eating healthier can improve several of your heart risks at once. For instance, it helps you manage weight, cholesterol, and blood pressure. Here are ideas to help you make heart-healthy changes without giving up  all the foods and flavors you love.  Getting started  · Talk with your health care provider about eating plans, such as the DASH or Mediterranean diet. You may also be referred to a dietitian.  · Change a few things at a time. Give yourself time to get used to a few eating changes before adding more.  · Work to create a tasty, healthy eating plan that you can stick to for the rest of your life.    Goals for healthy eating  Below are some tips to improve your eating habits:  · Limit saturated fats and trans fats. Saturated fats raise your levels of cholesterol, so keep these fats to a minimum. They are found in foods such as fatty meats, whole milk, cheese, and palm and coconut oils. Avoid trans fats because they lower good cholesterol as well as raise bad cholesterol. Trans fats are most often found in processed foods.  · Reduce sodium (salt) intake. Eating too much salt may increase your blood pressure. Limit your sodium intake to 2,300 milligrams (mg) per day, or less if your health care provider recommends it. Dining out less often and eating fewer processed foods are two great ways to decrease the amount of salt you consume.  · Managing calories. A calorie is a unit of energy. Your body burns calories for fuel, but if you eat more calories than your body burns, the extras are stored as fat. Your health care provider can help you create a diet plan to manage your calories. This will likely include eating healthier foods as well as exercising regularly. To help you track your progress, keep a diary to record what you eat and how often you exercise.  Choose the right foods  Aim to make these foods staples of your diet. If you have diabetes, you may have different recommendations than what is listed here:  · Fruits and vegetable provide plenty of nutrients without a lot of calories. At meals, fill half your plate with these foods. Split the other half of your plate between whole grains and lean protein.  · Whole  grains are high in fiber and rich in vitamins and nutrients. Good choices include whole-wheat bread, pasta, and brown rice.  · Lean proteins give you nutrition with less fat. Good choices include fish, skinless chicken, and beans.  · Low-fat or nonfat dairy provides nutrients without a lot of fat. Try low-fat or nonfat milk, cheese, or yogurt.  · Healthy fats can be good for you in small amounts. These are unsaturated fats, such as olive oil, nuts, and fish. Try to have at least 2 servings per week of fatty fish such as salmon, sardines, mackerel, rainbow trout, and albacore tuna. These contain omega-3 fatty acids, which are good for your heart. Flaxseed is another source of a heart-healthy fat.  More on heart healthy eating    Read food labels  Healthy eating starts at the grocery store. Be sure to pay attention to food labels on packaged foods. Look for products that are high in fiber and protein, and low in saturated fat, cholesterol, and sodium. Avoid products that contain trans fat. And pay close attention to serving size. For instance, if you plan to eat two servings, double all the numbers on the label.  Prepare food right  A key part of healthy cooking is cutting down on added fat and salt. Look on the internet for lower-fat, lower-sodium recipes. Also, try these tips:  · Remove fat from meat and skin from poultry before cooking.  · Skim fat from the surface of soups and sauces.  · Broil, boil, bake, steam, grill, and microwave food without added fats.  · Choose ingredients that spice up your food without adding calories, fat, or sodium. Try these items: horseradish, hot sauce, lemon, mustard, nonfat salad dressings, and vinegar. For salt-free herbs and spices, try basil, cilantro, cinnamon, pepper, and rosemary.  Date Last Reviewed: 6/25/2015  © 1184-8828 [employer]. [address] [place] [address], [place] [address]. All rights reserved. This information is not intended as a substitute for  professional medical care. Always follow your healthcare professional's instructions.

## 2020-03-11 NOTE — PROGRESS NOTES
Subjective:   @Patient ID:  Dori Whatley is a 79 y.o. female who presents for follow-up of Hospital Follow Up      HPI:     Patient is here for follow up  She  Had long hospitalization at [facility]. She was found to have large pericardial effusion with tamponade physiology so pericardiocentesis was done 1/27. Almost 1 l out. Cytology was negative. Cx was +ve for staph hominis. ID evaluated and was started on abx which she completed.     Today she feels much better. Os sat in high 90s with no oxygen. She completed physical therapy. She is accompanied by her     She has PPM that was placed by Dr. Garcia for CHB, hx of A.fib (Eliquis) from [place] [facility] around 11/2019, she was on eliquis at home. No hx of PCI or CABG     Also she has hx of breast CA s/p surgery 30 years ago and she gets yearly Mammogram,    Prior cardiovascular  Hx  --------------------------------  Echo 2/2020   · Low normal left ventricular systolic function. The estimated ejection fraction is 50%.  · Grade I (mild) left ventricular diastolic dysfunction consistent with impaired relaxation.  · No wall motion abnormalities.  · Normal right ventricular systolic function.  · The estimated PA systolic pressure is 38 mmHg.  · Intermediate central venous pressure (8 mmHg).  · Mild aortic valve stenosis.  · Aortic valve area is 1.94 cm2; peak velocity is 2.0 m/s; mean gradient is 10 mmHg.  · No Pericardial Effusion is seen.            Patient Active Problem List    Diagnosis Date Noted    Complete heart block 03/11/2020    Staph infection     Physical debility 01/29/2020    Paroxysmal atrial fibrillation 01/25/2020    Postsurgical cardiac pacemaker in situ 01/25/2020    Pericardial effusion 01/24/2020    Benign essential hypertension 12/18/2016    Gastroesophageal reflux disease 12/18/2016    Hyperlipidemia 12/18/2016    Neuropathy 12/18/2016    Unspecified vitamin D deficiency 12/18/2016     Dx updated per  2019 IMO Load      Disorder of joint 09/26/2016    Chronic low back pain 09/26/2016    Status post lumbar spine operation 09/26/2016    Knee pain 09/26/2016    Chronic back pain 07/14/2016    History of spinal surgery 07/14/2016    Hypertension 10/13/2014    Cobalamin deficiency 05/14/2014    Atherosclerosis of artery 10/07/2013    Compression fracture of lumbosacral spine 10/07/2013    Degeneration of intervertebral disc of lumbar region 10/07/2013    Mechanical complication of internal fixation device such as nail, plate or farida 10/07/2013    Pulmonary hypertension 10/07/2013    Restless leg 10/07/2013    History of mastectomy 10/07/2013    Personal history of transient ischemic attack (TIA), and cerebral infarction without residual deficits 10/07/2013    Post-menopausal osteoporosis 11/15/2012    Type 2 diabetes mellitus 11/15/2012     Overview:   ICD-10 Transition                      LAST HbA1c  Lab Results   Component Value Date    HGBA1C 5.8 (H) 01/24/2020       Lipid panel  No results found for: CHOL  No results found for: HDL  No results found for: LDLCALC  No results found for: TRIG  No results found for: CHOLHDL         Review of Systems   Constitution: Negative for chills and fever.   HENT: Negative for hearing loss and nosebleeds.    Eyes: Negative for blurred vision.   Cardiovascular: Negative for chest pain and palpitations.   Respiratory: Negative for hemoptysis and shortness of breath.    Hematologic/Lymphatic: Negative for bleeding problem.   Skin: Negative for itching.   Musculoskeletal: Positive for arthritis and muscle weakness. Negative for falls.   Gastrointestinal: Negative for abdominal pain and hematochezia.   Genitourinary: Negative for hematuria.   Neurological: Negative for dizziness and loss of balance.   Psychiatric/Behavioral: Negative for altered mental status and depression.       Objective:   Physical Exam   Constitutional: She is oriented to person, place, and  time. She appears well-developed and well-nourished.   HENT:   Head: Normocephalic and atraumatic.   Eyes: Conjunctivae are normal.   Neck: Neck supple. Carotid bruit is not present.   Cardiovascular: Normal rate and regular rhythm. Exam reveals no gallop and no friction rub.   Murmur (grade II systolic) heard.  Pulmonary/Chest: Effort normal and breath sounds normal. No stridor. No respiratory distress. She has no wheezes.   Neurological: She is alert and oriented to person, place, and time.   Skin: Skin is warm and dry.   Psychiatric: She has a normal mood and affect. Her behavior is normal.       Assessment:     1. Pericardial effusion    2. Benign essential hypertension    3. Paroxysmal atrial fibrillation    4. Hyperlipidemia, unspecified hyperlipidemia type    5. Hypertension, unspecified type    6. Postsurgical cardiac pacemaker in situ    7. Complete heart block        Plan:   . Resolved pericardial effusion. Infectious in etiology. Post pericardiocentesis  . Continue OAC  - Pacemaker check with Dr. Garcia      Pertinent cardiac images and EKG reviewed independently.    Continue with current medical plan and lifestyle changes.  Return sooner for concerns or questions. If symptoms persist go to the ED  I have reviewed all pertinent data including patient's medical history in detail and updated the computerized patient record.     No orders of the defined types were placed in this encounter.      Follow up as scheduled.     She expressed verbal understanding and agreed with the plan    Patient's Medications   New Prescriptions    No medications on file   Previous Medications    ACETAMINOPHEN (TYLENOL) 325 MG TABLET    Take 2 tablets (650 mg total) by mouth every 4 (four) hours as needed for Pain or Temperature greater than (101.5).    AMMONIUM LACTATE 12 % CREA    Apply 1 Act topically 2 (two) times daily.    APIXABAN (ELIQUIS) 5 MG TAB    Take 1 tablet (5 mg total) by mouth 2 (two) times daily.    CARVEDILOL  (COREG) 3.125 MG TABLET    Take 3.125 mg by mouth 2 (two) times daily.     CLOPIDOGREL (PLAVIX) 75 MG TABLET    Take 75 mg by mouth once daily.     DULOXETINE (CYMBALTA) 30 MG CAPSULE    Take 30 mg by mouth once daily.    ERGOCALCIFEROL (VITAMIN D2) 50,000 UNIT CAP    Take 50,000 Units by mouth every 7 days.    GABAPENTIN (NEURONTIN) 300 MG CAPSULE    Take 300 mg by mouth 3 (three) times daily.    GLIPIZIDE (GLUCOTROL) 5 MG TR24    Take 5 mg by mouth daily with breakfast.     INSULIN ASPART U-100 (NOVOLOG) 100 UNIT/ML (3 ML) INPN PEN    Inject 0-5 Units into the skin before meals and at bedtime as needed (Hyperglycemia).    LOSARTAN-HYDROCHLOROTHIAZIDE 100-12.5 MG (HYZAAR) 100-12.5 MG TAB    Take 1 tablet by mouth once daily.    METHOCARBAMOL (ROBAXIN) 500 MG TAB    Take 1 tablet (500 mg total) by mouth 4 (four) times daily as needed (muscle spasms).    ONDANSETRON (ZOFRAN-ODT) 8 MG TBDL    Take 1 tablet (8 mg total) by mouth every 8 (eight) hours as needed.    PANTOPRAZOLE (PROTONIX) 40 MG TABLET    Take 40 mg by mouth once daily.     POLYETHYLENE GLYCOL (GLYCOLAX) 17 GRAM PWPK    Take 17 g by mouth 2 (two) times daily as needed.    SODIUM CHLORIDE 0.9% (NORMAL SALINE FLUSH) INJECTION    Inject 10 mLs into the vein every 8 (eight) hours.   Modified Medications    No medications on file   Discontinued Medications    No medications on file

## 2020-03-30 LAB
ACID FAST MOD KINY STN SPEC: NORMAL
MYCOBACTERIUM SPEC QL CULT: NORMAL

## 2021-03-22 ENCOUNTER — OFFICE VISIT (OUTPATIENT)
Dept: CARDIOLOGY | Facility: CLINIC | Age: 81
End: 2021-03-22
Payer: MEDICARE

## 2021-03-22 VITALS
SYSTOLIC BLOOD PRESSURE: 153 MMHG | OXYGEN SATURATION: 94 % | DIASTOLIC BLOOD PRESSURE: 68 MMHG | HEART RATE: 69 BPM | WEIGHT: 162 LBS | BODY MASS INDEX: 26.96 KG/M2

## 2021-03-22 DIAGNOSIS — E78.5 HYPERLIPIDEMIA, UNSPECIFIED HYPERLIPIDEMIA TYPE: ICD-10-CM

## 2021-03-22 DIAGNOSIS — I44.2 COMPLETE HEART BLOCK: ICD-10-CM

## 2021-03-22 DIAGNOSIS — I27.20 PULMONARY HYPERTENSION: ICD-10-CM

## 2021-03-22 DIAGNOSIS — I70.90 ATHEROSCLEROSIS OF ARTERY: Primary | ICD-10-CM

## 2021-03-22 DIAGNOSIS — I10 HYPERTENSION, UNSPECIFIED TYPE: ICD-10-CM

## 2021-03-22 DIAGNOSIS — I10 BENIGN ESSENTIAL HYPERTENSION: ICD-10-CM

## 2021-03-22 DIAGNOSIS — I48.0 PAROXYSMAL ATRIAL FIBRILLATION: ICD-10-CM

## 2021-03-22 PROCEDURE — 1101F PR PT FALLS ASSESS DOC 0-1 FALLS W/OUT INJ PAST YR: ICD-10-PCS | Mod: CPTII,S$GLB,, | Performed by: INTERNAL MEDICINE

## 2021-03-22 PROCEDURE — 3078F PR MOST RECENT DIASTOLIC BLOOD PRESSURE < 80 MM HG: ICD-10-PCS | Mod: CPTII,S$GLB,, | Performed by: INTERNAL MEDICINE

## 2021-03-22 PROCEDURE — 3288F FALL RISK ASSESSMENT DOCD: CPT | Mod: CPTII,S$GLB,, | Performed by: INTERNAL MEDICINE

## 2021-03-22 PROCEDURE — 99214 OFFICE O/P EST MOD 30 MIN: CPT | Mod: S$GLB,,, | Performed by: INTERNAL MEDICINE

## 2021-03-22 PROCEDURE — 1159F MED LIST DOCD IN RCRD: CPT | Mod: S$GLB,,, | Performed by: INTERNAL MEDICINE

## 2021-03-22 PROCEDURE — 1126F PR PAIN SEVERITY QUANTIFIED, NO PAIN PRESENT: ICD-10-PCS | Mod: S$GLB,,, | Performed by: INTERNAL MEDICINE

## 2021-03-22 PROCEDURE — 3077F PR MOST RECENT SYSTOLIC BLOOD PRESSURE >= 140 MM HG: ICD-10-PCS | Mod: CPTII,S$GLB,, | Performed by: INTERNAL MEDICINE

## 2021-03-22 PROCEDURE — 3078F DIAST BP <80 MM HG: CPT | Mod: CPTII,S$GLB,, | Performed by: INTERNAL MEDICINE

## 2021-03-22 PROCEDURE — 1159F PR MEDICATION LIST DOCUMENTED IN MEDICAL RECORD: ICD-10-PCS | Mod: S$GLB,,, | Performed by: INTERNAL MEDICINE

## 2021-03-22 PROCEDURE — 1101F PT FALLS ASSESS-DOCD LE1/YR: CPT | Mod: CPTII,S$GLB,, | Performed by: INTERNAL MEDICINE

## 2021-03-22 PROCEDURE — 3288F PR FALLS RISK ASSESSMENT DOCUMENTED: ICD-10-PCS | Mod: CPTII,S$GLB,, | Performed by: INTERNAL MEDICINE

## 2021-03-22 PROCEDURE — 99214 PR OFFICE/OUTPT VISIT, EST, LEVL IV, 30-39 MIN: ICD-10-PCS | Mod: S$GLB,,, | Performed by: INTERNAL MEDICINE

## 2021-03-22 PROCEDURE — 1126F AMNT PAIN NOTED NONE PRSNT: CPT | Mod: S$GLB,,, | Performed by: INTERNAL MEDICINE

## 2021-03-22 PROCEDURE — 3077F SYST BP >= 140 MM HG: CPT | Mod: CPTII,S$GLB,, | Performed by: INTERNAL MEDICINE

## 2021-03-22 RX ORDER — BENZONATATE 100 MG/1
100 CAPSULE ORAL 3 TIMES DAILY PRN
COMMUNITY

## 2021-05-24 NOTE — ASSESSMENT & PLAN NOTE
-HR 60s-70s overnight; no arrhythmias noted on telemetry  -on Eliquis BID at home-held due to need for pericardiocentesis; on SQ Heparin currently for DVT prophylaxis; will continue to hold Eliquis for now and anticipate resumption closer to discharge   -continue to monitor on telemetry; continue Coreg BID    - - -

## 2021-06-16 NOTE — PROGRESS NOTES
4/1 Tried calling the pt, unable to leave message, VM box is full.    Subjective:      Patient ID: Dori Whatley is a 79 y.o. female.    Chief Complaint:Follow-up  [facility] pericardial effusion culture    History of Present Illness  79 year old woman with pericardial effusion s/p tap on 1/27/2020 with WBC count of 1295 (26% segs, 67% lymphs), cytology negative for malignant cells; had recent pneumonia and pleural effusions; has history of back surgeries with hardware in place and pacemaker in place. BC negative. Cefazolin started 1/31/20. Stop date 2/28/20. Transferred to [facility] for further care. Had one episode of sweats about 1.5 weeks ago, but otherwise doing OK. No CP SOB NVabd pain loose stools dysuria. Feels good    Review of Systems   Constitution: Negative for decreased appetite, fever and malaise/fatigue.   HENT: Negative for congestion, hoarse voice and sore throat.    Eyes: Negative for blurred vision, discharge and photophobia.   Cardiovascular: Negative for chest pain, dyspnea on exertion and leg swelling.   Respiratory: Negative for cough, shortness of breath and sputum production.    Endocrine: Negative.    Hematologic/Lymphatic: Negative.    Skin: Negative.    Musculoskeletal: Negative.    Neurological: Negative.    Psychiatric/Behavioral: Negative.      Objective:   Physical Exam   Constitutional: She is oriented to person, place, and time. She appears well-developed and well-nourished. No distress.   HENT:   Head: Normocephalic and atraumatic.   Nose: Nose normal.   Mouth/Throat: No oropharyngeal exudate.   Eyes: Pupils are equal, round, and reactive to light. Conjunctivae and EOM are normal.   Neck: Normal range of motion. Neck supple.   Cardiovascular:   Normal rate. 2/6 systolic murmur apex. Decreased pedal pulses. Pacer without redness or tenderness.   Pulmonary/Chest: Effort normal and breath sounds normal. She has no rales. She exhibits no tenderness.   Abdominal: Soft. Bowel sounds are normal. She exhibits no distension and no mass. There is no  tenderness. There is no guarding.   Genitourinary:   Genitourinary Comments: No coy   Musculoskeletal: Normal range of motion. She exhibits no edema or tenderness.   Lymphadenopathy:     She has no cervical adenopathy.   Neurological: She is alert and oriented to person, place, and time. No cranial nerve deficit or sensory deficit. She exhibits normal muscle tone. Coordination normal.   Skin: Skin is warm. No rash noted.   Nursing note and vitals reviewed.    Assessment:       No diagnosis found.    Pericarditis bacterial from [facility]. Getting IV cefazolin to finish 2/28/20.     Plan:       Cont cefazolin until 2/28/20  Add 2 weeks of po doxycycline after completion of cefazolin.  RTC 3/25/20

## 2021-07-15 ENCOUNTER — TELEPHONE (OUTPATIENT)
Dept: CARDIOLOGY | Facility: CLINIC | Age: 81
End: 2021-07-15
Payer: MEDICARE

## 2024-06-18 DIAGNOSIS — E78.5 HYPERLIPIDEMIA, UNSPECIFIED HYPERLIPIDEMIA TYPE: ICD-10-CM

## 2024-06-18 DIAGNOSIS — I48.0 PAROXYSMAL ATRIAL FIBRILLATION: Primary | ICD-10-CM

## 2024-06-19 ENCOUNTER — OFFICE VISIT (OUTPATIENT)
Dept: CARDIOLOGY | Facility: CLINIC | Age: 84
End: 2024-06-19
Payer: MEDICARE

## 2024-06-19 VITALS
WEIGHT: 125 LBS | SYSTOLIC BLOOD PRESSURE: 120 MMHG | HEIGHT: 64 IN | DIASTOLIC BLOOD PRESSURE: 72 MMHG | BODY MASS INDEX: 21.34 KG/M2 | HEART RATE: 79 BPM | OXYGEN SATURATION: 97 %

## 2024-06-19 DIAGNOSIS — I10 HYPERTENSION, UNSPECIFIED TYPE: ICD-10-CM

## 2024-06-19 DIAGNOSIS — E78.5 HYPERLIPIDEMIA, UNSPECIFIED HYPERLIPIDEMIA TYPE: ICD-10-CM

## 2024-06-19 DIAGNOSIS — I70.90 ATHEROSCLEROSIS OF ARTERY: Primary | ICD-10-CM

## 2024-06-19 DIAGNOSIS — I44.2 COMPLETE HEART BLOCK: ICD-10-CM

## 2024-06-19 DIAGNOSIS — I27.20 PULMONARY HYPERTENSION: ICD-10-CM

## 2024-06-19 DIAGNOSIS — I10 BENIGN ESSENTIAL HYPERTENSION: ICD-10-CM

## 2024-06-19 DIAGNOSIS — Z86.73 PERSONAL HISTORY OF TRANSIENT ISCHEMIC ATTACK (TIA), AND CEREBRAL INFARCTION WITHOUT RESIDUAL DEFICITS: ICD-10-CM

## 2024-06-19 DIAGNOSIS — I48.0 PAROXYSMAL ATRIAL FIBRILLATION: ICD-10-CM

## 2024-06-19 PROCEDURE — 99999 PR PBB SHADOW E&M-EST. PATIENT-LVL IV: CPT | Mod: PBBFAC,,, | Performed by: INTERNAL MEDICINE

## 2024-06-19 PROCEDURE — 3078F DIAST BP <80 MM HG: CPT | Mod: CPTII,S$GLB,, | Performed by: INTERNAL MEDICINE

## 2024-06-19 PROCEDURE — 1126F AMNT PAIN NOTED NONE PRSNT: CPT | Mod: CPTII,S$GLB,, | Performed by: INTERNAL MEDICINE

## 2024-06-19 PROCEDURE — 1159F MED LIST DOCD IN RCRD: CPT | Mod: CPTII,S$GLB,, | Performed by: INTERNAL MEDICINE

## 2024-06-19 PROCEDURE — 3288F FALL RISK ASSESSMENT DOCD: CPT | Mod: CPTII,S$GLB,, | Performed by: INTERNAL MEDICINE

## 2024-06-19 PROCEDURE — 3074F SYST BP LT 130 MM HG: CPT | Mod: CPTII,S$GLB,, | Performed by: INTERNAL MEDICINE

## 2024-06-19 PROCEDURE — 99214 OFFICE O/P EST MOD 30 MIN: CPT | Mod: S$GLB,,, | Performed by: INTERNAL MEDICINE

## 2024-06-19 PROCEDURE — 1101F PT FALLS ASSESS-DOCD LE1/YR: CPT | Mod: CPTII,S$GLB,, | Performed by: INTERNAL MEDICINE

## 2024-06-19 PROCEDURE — 1160F RVW MEDS BY RX/DR IN RCRD: CPT | Mod: CPTII,S$GLB,, | Performed by: INTERNAL MEDICINE

## 2024-06-19 NOTE — PROGRESS NOTES
Subjective:   @Patient ID:  Dori Whatley is a 83 y.o. female who presents for follow-up of No chief complaint on file.      HPI:   6/2024: F/U. Accompanied by her .  Very pleasant 83 years old female.  She denies any significant shortness of breath or chest pain.  Follows up with EP from [place] [facility].  She has recurrent falls due to loss of balance.  No syncope or presyncope reported.  She is on Eliquis 5 mg b.i.d. for Afib      3/2021: Patient is here for 1 yr  follow up. She stated that she is doing well since last visit. She saw EP Dr. Garcia She is not requiring much RV pacing.   No chest pain, no palpitations. She is compliant with her medications. She didn't take it today. She is accompanied by her   Historically:    She had long hospitalization at [facility]. She was found to have large pericardial effusion with tamponade physiology so pericardiocentesis was done 1/27. Almost 1 l out. Cytology was negative. Cx was +ve for staph hominis. ID evaluated and was started on abx which she completed.     She has PPM that was placed by Dr. Garcia for CHB, hx of A.fib (Eliquis) from [place] [facility] around 11/2019, she was on eliquis at home. No hx of PCI or CABG     Also she has hx of breast CA s/p surgery 30 years ago and she gets yearly Mammogram,    Prior cardiovascular  Hx  --------------------------------  Echo 2/2024:   1. Normal left ventricular cavity size. Normal left ventricular systolic function. LVEF   55 - 65%. Mild asymmetrical septal hypertrophy.   2. Normal right ventricular size. Normal right ventricular systolic function.   3. Mild aortic valve stenosis.   4. Mild tricuspid valve regurgitation.     Echo 2/2020   Low normal left ventricular systolic function. The estimated ejection fraction is 50%.  Grade I (mild) left ventricular diastolic dysfunction consistent with impaired relaxation.  No wall motion abnormalities.  Normal right ventricular  systolic function.  The estimated PA systolic pressure is 38 mmHg.  Intermediate central venous pressure (8 mmHg).  Mild aortic valve stenosis.  Aortic valve area is 1.94 cm2; peak velocity is 2.0 m/s; mean gradient is 10 mmHg.  No Pericardial Effusion is seen.            Patient Active Problem List    Diagnosis Date Noted    Complete heart block 2020    Staph infection     Physical debility 2020    Paroxysmal atrial fibrillation 2020    Postsurgical cardiac pacemaker in situ 2020    Pericardial effusion 2020    Benign essential hypertension 2016    Gastroesophageal reflux disease 2016    Hyperlipidemia 2016    Neuropathy 2016    Unspecified vitamin D deficiency 2016     Dx updated per  IMO Load      Disorder of joint 2016    Chronic low back pain 2016    Status post lumbar spine operation 2016    Knee pain 2016    Chronic back pain 2016    History of spinal surgery 2016    Hypertension 10/13/2014    Cobalamin deficiency 2014    Atherosclerosis of artery 10/07/2013    Compression fracture of lumbosacral spine 10/07/2013    Degeneration of intervertebral disc of lumbar region 10/07/2013    Mechanical complication of internal fixation device such as nail, plate or farida 10/07/2013    Pulmonary hypertension 10/07/2013    Restless leg 10/07/2013    History of mastectomy 10/07/2013    Personal history of transient ischemic attack (TIA), and cerebral infarction without residual deficits 10/07/2013    Post-menopausal osteoporosis 11/15/2012    Type 2 diabetes mellitus 11/15/2012     Overview:   ICD-10 Transition             Right Arm BP - Sittin/82  Left Arm BP - Sittin/72        LAST HbA1c  Lab Results   Component Value Date    HGBA1C 6.5 2024       Lipid panel  Lab Results   Component Value Date    CHOL 107 2024    CHOL 95 (L) 10/26/2022    CHOL 93 (L) 2022     Lab Results   Component  "Value Date    HDL 27 (L) 02/13/2024    HDL 26 (L) 10/26/2022    HDL 28 (L) 02/17/2022     Lab Results   Component Value Date    LDLCALC 57 02/13/2024    LDLCALC 44 10/26/2022    LDLCALC 39 02/17/2022     Lab Results   Component Value Date    TRIG 113 02/13/2024    TRIG 140 10/26/2022    TRIG 153 (H) 02/17/2022     No results found for: "CHOLHDL"         Review of Systems   Constitutional: Negative for chills and fever.   HENT:  Negative for hearing loss and nosebleeds.    Eyes:  Negative for blurred vision.   Cardiovascular:  Negative for chest pain and palpitations.   Respiratory:  Negative for hemoptysis and shortness of breath.    Hematologic/Lymphatic: Negative for bleeding problem.   Skin:  Negative for itching.   Musculoskeletal:  Positive for arthritis and muscle weakness. Negative for falls.   Gastrointestinal:  Negative for abdominal pain and hematochezia.   Genitourinary:  Negative for hematuria.   Neurological:  Negative for dizziness and loss of balance.   Psychiatric/Behavioral:  Negative for altered mental status and depression.        Objective:   Physical Exam  Constitutional:       Appearance: She is well-developed.   HENT:      Head: Normocephalic and atraumatic.   Eyes:      Conjunctiva/sclera: Conjunctivae normal.   Neck:      Vascular: No carotid bruit.   Cardiovascular:      Rate and Rhythm: Normal rate and regular rhythm.      Heart sounds: Murmur (grade II systolic) heard.      No friction rub. No gallop.   Pulmonary:      Effort: Pulmonary effort is normal. No respiratory distress.      Breath sounds: Normal breath sounds. No stridor. No wheezing.   Musculoskeletal:      Cervical back: Neck supple.   Skin:     General: Skin is warm and dry.   Neurological:      Mental Status: She is alert and oriented to person, place, and time.   Psychiatric:         Behavior: Behavior normal.         Assessment:     1. Atherosclerosis of artery    2. Paroxysmal atrial fibrillation    3. Hyperlipidemia, " unspecified hyperlipidemia type    4. Complete heart block    5. Benign essential hypertension    6. Hypertension, unspecified type    7. Pulmonary hypertension    8. Personal history of transient ischemic attack (TIA), and cerebral infarction without residual deficits          Plan:   . Resolved pericardial effusion. Infectious in etiology. Post pericardiocentesis.  No no recurrence  She is on Eliquis for anticoagulation.  Given her weight and age I will recommend cutting down Eliquis to 2.5 mg b.i.d. she has frequent falls.  Discussed about fall precautions if patient continues to have significant falls and head trauma then may need to stop Eliquis and consider left atrial appendage occlusion    - Pacemaker check with Dr. Garcia    One year follow-up with me or sooner as needed        Pertinent cardiac images and EKG reviewed independently.    Continue with current medical plan and lifestyle changes.  Return sooner for concerns or questions. If symptoms persist go to the ED  I have reviewed all pertinent data including patient's medical history in detail and updated the computerized patient record.     No orders of the defined types were placed in this encounter.      Follow up as scheduled.     She expressed verbal understanding and agreed with the plan    Patient's Medications   New Prescriptions    No medications on file   Previous Medications    ACETAMINOPHEN (TYLENOL) 325 MG TABLET    Take 2 tablets (650 mg total) by mouth every 4 (four) hours as needed for Pain or Temperature greater than (101.5).    BENZONATATE (TESSALON) 100 MG CAPSULE    Take 100 mg by mouth 3 (three) times daily as needed for Cough.    CARVEDILOL (COREG) 3.125 MG TABLET    Take 3.125 mg by mouth 2 (two) times daily.     DULOXETINE (CYMBALTA) 30 MG CAPSULE    Take 30 mg by mouth once daily.    ERGOCALCIFEROL (VITAMIN D2) 50,000 UNIT CAP    Take 50,000 Units by mouth every 7 days.    GABAPENTIN (NEURONTIN) 300 MG CAPSULE    Take 300 mg by  mouth 3 (three) times daily.    INSULIN ASPART U-100 (NOVOLOG) 100 UNIT/ML (3 ML) INPN PEN    Inject 0-5 Units into the skin before meals and at bedtime as needed (Hyperglycemia).    LOSARTAN-HYDROCHLOROTHIAZIDE 100-12.5 MG (HYZAAR) 100-12.5 MG TAB    Take 1 tablet by mouth once daily.    METHOCARBAMOL (ROBAXIN) 500 MG TAB    Take 1 tablet (500 mg total) by mouth 4 (four) times daily as needed (muscle spasms).    MULTIVIT-MINERALS/FOLIC ACID (CENTRUM VITAMINTS ORAL)    Take by mouth.    PANTOPRAZOLE (PROTONIX) 40 MG TABLET    Take 40 mg by mouth once daily.     POLYETHYLENE GLYCOL (GLYCOLAX) 17 GRAM PWPK    Take 17 g by mouth 2 (two) times daily as needed.    SODIUM CHLORIDE 0.9% (NORMAL SALINE FLUSH) INJECTION    Inject 10 mLs into the vein every 8 (eight) hours.   Modified Medications    Modified Medication Previous Medication    APIXABAN (ELIQUIS) 2.5 MG TAB apixaban (ELIQUIS) 5 mg Tab       Take 1 tablet (2.5 mg total) by mouth 2 (two) times daily.    Take 1 tablet (5 mg total) by mouth 2 (two) times daily.   Discontinued Medications    No medications on file

## (undated) DEVICE — KIT INTRODUCER MICROPUNCTR 4F

## (undated) DEVICE — PAD DEFIB CADENCE ADULT R2

## (undated) DEVICE — COVER PROBE US 5.5X58L NON LTX

## (undated) DEVICE — SET MICRO PUNCT 4FR/MPIS-401

## (undated) DEVICE — DRAIN JACKSON PRATT 10MM 20/CA

## (undated) DEVICE — Device

## (undated) DEVICE — COVER BAND BAG 40 X 40

## (undated) DEVICE — KIT PERICARDIOCENTESIS